# Patient Record
Sex: FEMALE | Race: WHITE | NOT HISPANIC OR LATINO | Employment: FULL TIME | URBAN - METROPOLITAN AREA
[De-identification: names, ages, dates, MRNs, and addresses within clinical notes are randomized per-mention and may not be internally consistent; named-entity substitution may affect disease eponyms.]

---

## 2018-01-25 PROBLEM — L30.9 DERMATITIS: Status: ACTIVE | Noted: 2018-01-25

## 2018-01-25 PROBLEM — I10 HYPERTENSION: Status: ACTIVE | Noted: 2018-01-25

## 2018-01-26 PROBLEM — M51.36 DEGENERATIVE DISC DISEASE, LUMBAR: Status: ACTIVE | Noted: 2018-01-26

## 2018-01-26 PROBLEM — M51.369 DEGENERATIVE DISC DISEASE, LUMBAR: Status: ACTIVE | Noted: 2018-01-26

## 2018-06-13 PROBLEM — L30.9 DERMATITIS: Status: RESOLVED | Noted: 2018-01-25 | Resolved: 2018-06-13

## 2018-09-24 ENCOUNTER — HOSPITAL ENCOUNTER (OUTPATIENT)
Dept: LAB | Facility: MEDICAL CENTER | Age: 53
End: 2018-09-24
Attending: NEUROLOGICAL SURGERY
Payer: COMMERCIAL

## 2018-09-24 ENCOUNTER — HOSPITAL ENCOUNTER (OUTPATIENT)
Dept: RADIOLOGY | Facility: MEDICAL CENTER | Age: 53
End: 2018-09-24
Attending: NEUROLOGICAL SURGERY
Payer: COMMERCIAL

## 2018-09-24 DIAGNOSIS — M48.061 SPINAL STENOSIS, LUMBAR REGION, WITHOUT NEUROGENIC CLAUDICATION: ICD-10-CM

## 2018-09-24 LAB
ANION GAP SERPL CALC-SCNC: 8 MMOL/L (ref 0–11.9)
APPEARANCE UR: ABNORMAL
APTT PPP: 27.9 SEC (ref 24.7–36)
BACTERIA #/AREA URNS HPF: NEGATIVE /HPF
BASOPHILS # BLD AUTO: 0.9 % (ref 0–1.8)
BASOPHILS # BLD: 0.04 K/UL (ref 0–0.12)
BILIRUB UR QL STRIP.AUTO: NEGATIVE
BUN SERPL-MCNC: 12 MG/DL (ref 8–22)
CALCIUM SERPL-MCNC: 9.1 MG/DL (ref 8.5–10.5)
CHLORIDE SERPL-SCNC: 108 MMOL/L (ref 96–112)
CO2 SERPL-SCNC: 25 MMOL/L (ref 20–33)
COLOR UR: YELLOW
CREAT SERPL-MCNC: 0.56 MG/DL (ref 0.5–1.4)
EOSINOPHIL # BLD AUTO: 0.18 K/UL (ref 0–0.51)
EOSINOPHIL NFR BLD: 3.9 % (ref 0–6.9)
EPI CELLS #/AREA URNS HPF: NORMAL /HPF
ERYTHROCYTE [DISTWIDTH] IN BLOOD BY AUTOMATED COUNT: 45.3 FL (ref 35.9–50)
GLUCOSE SERPL-MCNC: 113 MG/DL (ref 65–99)
GLUCOSE UR STRIP.AUTO-MCNC: NEGATIVE MG/DL
HCT VFR BLD AUTO: 41.6 % (ref 37–47)
HGB BLD-MCNC: 13.7 G/DL (ref 12–16)
HYALINE CASTS #/AREA URNS LPF: NORMAL /LPF
IMM GRANULOCYTES # BLD AUTO: 0.02 K/UL (ref 0–0.11)
IMM GRANULOCYTES NFR BLD AUTO: 0.4 % (ref 0–0.9)
INR PPP: 0.96 (ref 0.87–1.13)
KETONES UR STRIP.AUTO-MCNC: NEGATIVE MG/DL
LEUKOCYTE ESTERASE UR QL STRIP.AUTO: NEGATIVE
LYMPHOCYTES # BLD AUTO: 1.51 K/UL (ref 1–4.8)
LYMPHOCYTES NFR BLD: 32.4 % (ref 22–41)
MCH RBC QN AUTO: 30 PG (ref 27–33)
MCHC RBC AUTO-ENTMCNC: 32.9 G/DL (ref 33.6–35)
MCV RBC AUTO: 91 FL (ref 81.4–97.8)
MICRO URNS: ABNORMAL
MONOCYTES # BLD AUTO: 0.4 K/UL (ref 0–0.85)
MONOCYTES NFR BLD AUTO: 8.6 % (ref 0–13.4)
NEUTROPHILS # BLD AUTO: 2.51 K/UL (ref 2–7.15)
NEUTROPHILS NFR BLD: 53.8 % (ref 44–72)
NITRITE UR QL STRIP.AUTO: NEGATIVE
NRBC # BLD AUTO: 0 K/UL
NRBC BLD-RTO: 0 /100 WBC
PH UR STRIP.AUTO: 7 [PH]
PLATELET # BLD AUTO: 213 K/UL (ref 164–446)
PMV BLD AUTO: 10.5 FL (ref 9–12.9)
POTASSIUM SERPL-SCNC: 3.8 MMOL/L (ref 3.6–5.5)
PROT UR QL STRIP: NEGATIVE MG/DL
PROTHROMBIN TIME: 12.9 SEC (ref 12–14.6)
RBC # BLD AUTO: 4.57 M/UL (ref 4.2–5.4)
RBC # URNS HPF: NORMAL /HPF
RBC UR QL AUTO: NEGATIVE
SODIUM SERPL-SCNC: 141 MMOL/L (ref 135–145)
SP GR UR STRIP.AUTO: 1.01
UROBILINOGEN UR STRIP.AUTO-MCNC: 0.2 MG/DL
WBC # BLD AUTO: 4.7 K/UL (ref 4.8–10.8)
WBC #/AREA URNS HPF: NORMAL /HPF

## 2018-09-24 PROCEDURE — 36415 COLL VENOUS BLD VENIPUNCTURE: CPT

## 2018-09-24 PROCEDURE — 81001 URINALYSIS AUTO W/SCOPE: CPT

## 2018-09-24 PROCEDURE — 85610 PROTHROMBIN TIME: CPT

## 2018-09-24 PROCEDURE — 72110 X-RAY EXAM L-2 SPINE 4/>VWS: CPT

## 2018-09-24 PROCEDURE — 71046 X-RAY EXAM CHEST 2 VIEWS: CPT

## 2018-09-24 PROCEDURE — 85025 COMPLETE CBC W/AUTO DIFF WBC: CPT

## 2018-09-24 PROCEDURE — 85730 THROMBOPLASTIN TIME PARTIAL: CPT

## 2018-09-24 PROCEDURE — 80048 BASIC METABOLIC PNL TOTAL CA: CPT

## 2018-12-10 ENCOUNTER — HOSPITAL ENCOUNTER (EMERGENCY)
Facility: MEDICAL CENTER | Age: 53
End: 2018-12-10
Attending: EMERGENCY MEDICINE
Payer: COMMERCIAL

## 2018-12-10 VITALS
SYSTOLIC BLOOD PRESSURE: 206 MMHG | OXYGEN SATURATION: 98 % | HEART RATE: 99 BPM | HEIGHT: 65 IN | WEIGHT: 276.68 LBS | RESPIRATION RATE: 16 BRPM | BODY MASS INDEX: 46.1 KG/M2 | DIASTOLIC BLOOD PRESSURE: 113 MMHG | TEMPERATURE: 97.6 F

## 2018-12-10 DIAGNOSIS — M54.6 THORACOLUMBAR BACK PAIN: ICD-10-CM

## 2018-12-10 DIAGNOSIS — M54.50 THORACOLUMBAR BACK PAIN: ICD-10-CM

## 2018-12-10 PROCEDURE — 700111 HCHG RX REV CODE 636 W/ 250 OVERRIDE (IP): Performed by: EMERGENCY MEDICINE

## 2018-12-10 PROCEDURE — 99283 EMERGENCY DEPT VISIT LOW MDM: CPT

## 2018-12-10 PROCEDURE — 96372 THER/PROPH/DIAG INJ SC/IM: CPT

## 2018-12-10 RX ORDER — HYDROMORPHONE HYDROCHLORIDE 1 MG/ML
1 INJECTION, SOLUTION INTRAMUSCULAR; INTRAVENOUS; SUBCUTANEOUS ONCE
Status: COMPLETED | OUTPATIENT
Start: 2018-12-10 | End: 2018-12-10

## 2018-12-10 RX ADMIN — HYDROMORPHONE HYDROCHLORIDE 1 MG: 1 INJECTION, SOLUTION INTRAMUSCULAR; INTRAVENOUS; SUBCUTANEOUS at 19:13

## 2018-12-10 ASSESSMENT — PAIN SCALES - GENERAL
PAINLEVEL_OUTOF10: 9
PAINLEVEL_OUTOF10: 0

## 2018-12-11 NOTE — ED NOTES
D/c inst reviewed w/ the pt to include f/u care and return as needed.  The pt verb understanding.  asst out in a w/c.

## 2018-12-11 NOTE — ED PROVIDER NOTES
"ED Provider Note    CHIEF COMPLAINT  Chief Complaint   Patient presents with   • Low Back Pain     Int severe bck spasms since 1630 Pt S/P back surg lami/lami redo L 4,5,6  10/3  by Dr. Hawkins        [facility]  Malia Mckeon is a 53 y.o. female who presents to the ED with complaints of thoracic back pain.  The patient had a laminectomy redo on October in the lower back region.  The patient is been doing well today she went to the store that had an acute onset of pain primary in the paraspinous muscular of the thoracolumbar region.  Patient does have a prescription for pain medications and muscle relaxants but she states that those never really helped her so she never took them.  Because of the increasing pain she try to contact her spine surgeon Dr. Hawkins who is office told him to come to the emergency department for evaluation.  Upon arrival here she describes the pain as an 8/10 type pain primarily in the thoracolumbar region denies any loss of bowel or bladder function denies any fevers chills denies any other symptoms.    REVIEW OF SYSTEMS  See HPI for further details. All other systems are negative.     PAST MEDICAL HISTORY  Past Medical History:   Diagnosis Date   • ASTHMA    • Cellulitis    • DVT (deep venous thrombosis) (HCC)    • Gangrene (HCC)     \"from a DVT\"   • Hypertension    • Muscle disorder    • Spider bite        FAMILY HISTORY  Family History   Problem Relation Age of Onset   • Heart Disease Mother    • Hypertension Mother      Patient's family history has been discussed and is been found to be noncontributory to his present illness  SOCIAL HISTORY  Social History     Social History   • Marital status:      Spouse name: N/A   • Number of children: N/A   • Years of education: N/A     Social History Main Topics   • Smoking status: Never Smoker   • Smokeless tobacco: Never Used   • Alcohol use Yes      Comment: occasional   • Drug use: No   • Sexual activity: Not on file     Other Topics " "Concern   • Not on file     Social History Narrative   • No narrative on file      Noreen Colin M.D.      SURGICAL HISTORY  Past Surgical History:   Procedure Laterality Date   • CHOLECYSTECTOMY     • HYSTERECTOMY LAPAROSCOPY     • I & D PROCEDURE         CURRENT MEDICATIONS  Home Medications    **Home medications have not yet been reviewed for this encounter**         ALLERGIES  Allergies   Allergen Reactions   • Clindamycin    • Keflex    • Latex    • Meloxicam Hives   • Penicillins    • Toradol      Stroke        PHYSICAL EXAM  VITAL SIGNS: BP (!) 206/113 Comment: hx htn In pain and has not had PM doses  Pulse 99   Temp 36.4 °C (97.6 °F) (Temporal)   Resp 16   Ht 1.651 m (5' 5\")   Wt (!) 125.5 kg (276 lb 10.8 oz)   SpO2 98%   BMI 46.04 kg/m²    Pulse Ox interpretation nonhypoxic    Constitutional: Well developed, Well nourished, No acute distress, Non-toxic appearance.   Neck: Supple, no cervical lymphadenopathy, no meningeal signs.  Nontender midline  Lymphatic: No lymphadenopathy noted.     Abdomen: Soft, nontender nondistended. Bowel sounds are present.   Skin: Warm, Dry, No erythema,   Back: Patient is tender about the paraspinous muscular of the thoracic spine.  Midline she is otherwise nontender.  Musculoskeletal: Good range of motion in all major joints. No tenderness to palpation or major deformities noted. Intact distal pulses, no clubbing, no cyanosis, no edema,  Neurologic: Alert & oriented x 3, Normal motor function, Normal sensory function, No focal deficits noted.   Psychiatric: Affect normal, Judgment normal, Mood normal.       RADIOLOGY/PROCEDURES  No orders to display     None    COURSE & MEDICAL DECISION MAKING  Pertinent Labs & Imaging studies reviewed. (See chart for details)  Patient presents for evaluation.  Clinically the patient is tender about the paraspinous muscular of her upper mid back.  She has no midline tent tenderness.  Patient is moving all 4 extremities and has no signs " of nerve impingement.  At this point I do feel is muscular strain.  I will give her a dose of Dilaudid here she has an appointment with her neurosurgeon tomorrow.  The patient will follow up tomorrow for further outpatient treatment and care.  Currently I do not feel the patient is cauda equina syndrome nor compressed cord.  I do for the patient has a muscular strain and recommend a follow-up as above.    FINAL IMPRESSION  1. Thoracolumbar back pain       The patient will return for new or worsening symptoms and is stable at the time of discharge.    The patient is referred to a primary physician for blood pressure management, diabetic screening, and for all other preventative health concerns.        DISPOSITION:  Patient will be discharged home in stable condition.    FOLLOW UP:  Bruce Hawkins M.D.  [id number] [facility] [id number]  [facility] [id number]  [phone]      tomorrow for further care      OUTPATIENT MEDICATIONS:  New Prescriptions    No medications on file         Electronically signed by: Rito Del Toro, 12/10/2018 7:07 PM

## 2020-04-24 PROBLEM — R73.03 PREDIABETES: Status: ACTIVE | Noted: 2020-04-24

## 2020-04-24 PROBLEM — R74.8 ELEVATED LIVER ENZYMES: Status: ACTIVE | Noted: 2020-04-24

## 2020-05-28 PROBLEM — Z86.19 HISTORY OF POSITIVE HEPATITIS C: Status: ACTIVE | Noted: 2020-05-28

## 2020-05-28 PROBLEM — Z98.890 HISTORY OF LUMBAR SURGERY: Status: ACTIVE | Noted: 2020-05-28

## 2020-11-01 PROBLEM — U07.1 COVID-19: Status: ACTIVE | Noted: 2020-11-01

## 2021-03-15 ENCOUNTER — APPOINTMENT (OUTPATIENT)
Dept: RADIOLOGY | Facility: MEDICAL CENTER | Age: 56
End: 2021-03-15
Attending: EMERGENCY MEDICINE
Payer: COMMERCIAL

## 2021-03-15 ENCOUNTER — HOSPITAL ENCOUNTER (EMERGENCY)
Facility: MEDICAL CENTER | Age: 56
End: 2021-03-15
Attending: EMERGENCY MEDICINE
Payer: COMMERCIAL

## 2021-03-15 VITALS
DIASTOLIC BLOOD PRESSURE: 95 MMHG | BODY MASS INDEX: 48.82 KG/M2 | WEIGHT: 293 LBS | SYSTOLIC BLOOD PRESSURE: 187 MMHG | TEMPERATURE: 97.8 F | HEART RATE: 82 BPM | OXYGEN SATURATION: 94 % | HEIGHT: 65 IN | RESPIRATION RATE: 18 BRPM

## 2021-03-15 DIAGNOSIS — T50.Z95A ADVERSE EFFECT OF VACCINE, INITIAL ENCOUNTER: ICD-10-CM

## 2021-03-15 DIAGNOSIS — I10 ESSENTIAL HYPERTENSION: ICD-10-CM

## 2021-03-15 LAB
ALBUMIN SERPL BCP-MCNC: 4.1 G/DL (ref 3.2–4.9)
ALBUMIN/GLOB SERPL: 1.1 G/DL
ALP SERPL-CCNC: 166 U/L (ref 30–99)
ALT SERPL-CCNC: 140 U/L (ref 2–50)
ANION GAP SERPL CALC-SCNC: 10 MMOL/L (ref 7–16)
AST SERPL-CCNC: 87 U/L (ref 12–45)
BASOPHILS # BLD AUTO: 0.9 % (ref 0–1.8)
BASOPHILS # BLD: 0.05 K/UL (ref 0–0.12)
BILIRUB SERPL-MCNC: 0.4 MG/DL (ref 0.1–1.5)
BUN SERPL-MCNC: 18 MG/DL (ref 8–22)
CALCIUM SERPL-MCNC: 9.2 MG/DL (ref 8.4–10.2)
CHLORIDE SERPL-SCNC: 102 MMOL/L (ref 96–112)
CO2 SERPL-SCNC: 27 MMOL/L (ref 20–33)
CREAT SERPL-MCNC: 0.61 MG/DL (ref 0.5–1.4)
EKG IMPRESSION: NORMAL
EOSINOPHIL # BLD AUTO: 0.25 K/UL (ref 0–0.51)
EOSINOPHIL NFR BLD: 4.7 % (ref 0–6.9)
ERYTHROCYTE [DISTWIDTH] IN BLOOD BY AUTOMATED COUNT: 43.1 FL (ref 35.9–50)
GLOBULIN SER CALC-MCNC: 3.8 G/DL (ref 1.9–3.5)
GLUCOSE SERPL-MCNC: 96 MG/DL (ref 65–99)
HCT VFR BLD AUTO: 45.6 % (ref 37–47)
HGB BLD-MCNC: 14.9 G/DL (ref 12–16)
IMM GRANULOCYTES # BLD AUTO: 0.01 K/UL (ref 0–0.11)
IMM GRANULOCYTES NFR BLD AUTO: 0.2 % (ref 0–0.9)
LYMPHOCYTES # BLD AUTO: 1.48 K/UL (ref 1–4.8)
LYMPHOCYTES NFR BLD: 27.9 % (ref 22–41)
MCH RBC QN AUTO: 28.9 PG (ref 27–33)
MCHC RBC AUTO-ENTMCNC: 32.7 G/DL (ref 33.6–35)
MCV RBC AUTO: 88.4 FL (ref 81.4–97.8)
MONOCYTES # BLD AUTO: 0.45 K/UL (ref 0–0.85)
MONOCYTES NFR BLD AUTO: 8.5 % (ref 0–13.4)
NEUTROPHILS # BLD AUTO: 3.06 K/UL (ref 2–7.15)
NEUTROPHILS NFR BLD: 57.8 % (ref 44–72)
NRBC # BLD AUTO: 0 K/UL
NRBC BLD-RTO: 0 /100 WBC
PLATELET # BLD AUTO: 209 K/UL (ref 164–446)
PMV BLD AUTO: 9.9 FL (ref 9–12.9)
POTASSIUM SERPL-SCNC: 4.3 MMOL/L (ref 3.6–5.5)
PROT SERPL-MCNC: 7.9 G/DL (ref 6–8.2)
RBC # BLD AUTO: 5.16 M/UL (ref 4.2–5.4)
SODIUM SERPL-SCNC: 139 MMOL/L (ref 135–145)
TROPONIN T SERPL-MCNC: 8 NG/L (ref 6–19)
WBC # BLD AUTO: 5.3 K/UL (ref 4.8–10.8)

## 2021-03-15 PROCEDURE — 99284 EMERGENCY DEPT VISIT MOD MDM: CPT

## 2021-03-15 PROCEDURE — 700101 HCHG RX REV CODE 250: Performed by: EMERGENCY MEDICINE

## 2021-03-15 PROCEDURE — 80053 COMPREHEN METABOLIC PANEL: CPT

## 2021-03-15 PROCEDURE — 96376 TX/PRO/DX INJ SAME DRUG ADON: CPT

## 2021-03-15 PROCEDURE — 700111 HCHG RX REV CODE 636 W/ 250 OVERRIDE (IP): Performed by: EMERGENCY MEDICINE

## 2021-03-15 PROCEDURE — 85025 COMPLETE CBC W/AUTO DIFF WBC: CPT

## 2021-03-15 PROCEDURE — 84484 ASSAY OF TROPONIN QUANT: CPT

## 2021-03-15 PROCEDURE — 93005 ELECTROCARDIOGRAM TRACING: CPT

## 2021-03-15 PROCEDURE — 71045 X-RAY EXAM CHEST 1 VIEW: CPT

## 2021-03-15 PROCEDURE — 96374 THER/PROPH/DIAG INJ IV PUSH: CPT

## 2021-03-15 PROCEDURE — 96375 TX/PRO/DX INJ NEW DRUG ADDON: CPT

## 2021-03-15 PROCEDURE — 93005 ELECTROCARDIOGRAM TRACING: CPT | Performed by: EMERGENCY MEDICINE

## 2021-03-15 RX ORDER — ATENOLOL 25 MG/1
25 TABLET ORAL 2 TIMES DAILY
Qty: 60 TABLET | Refills: 1 | Status: SHIPPED | OUTPATIENT
Start: 2021-03-15 | End: 2021-12-06

## 2021-03-15 RX ORDER — LABETALOL HYDROCHLORIDE 5 MG/ML
20 INJECTION, SOLUTION INTRAVENOUS ONCE
Status: COMPLETED | OUTPATIENT
Start: 2021-03-15 | End: 2021-03-15

## 2021-03-15 RX ORDER — MORPHINE SULFATE 4 MG/ML
4 INJECTION, SOLUTION INTRAMUSCULAR; INTRAVENOUS ONCE
Status: COMPLETED | OUTPATIENT
Start: 2021-03-15 | End: 2021-03-15

## 2021-03-15 RX ORDER — ONDANSETRON 2 MG/ML
4 INJECTION INTRAMUSCULAR; INTRAVENOUS ONCE
Status: COMPLETED | OUTPATIENT
Start: 2021-03-15 | End: 2021-03-15

## 2021-03-15 RX ORDER — METHYLPREDNISOLONE SODIUM SUCCINATE 125 MG/2ML
125 INJECTION, POWDER, LYOPHILIZED, FOR SOLUTION INTRAMUSCULAR; INTRAVENOUS ONCE
Status: COMPLETED | OUTPATIENT
Start: 2021-03-15 | End: 2021-03-15

## 2021-03-15 RX ORDER — DIPHENHYDRAMINE HYDROCHLORIDE 50 MG/ML
25 INJECTION INTRAMUSCULAR; INTRAVENOUS ONCE
Status: COMPLETED | OUTPATIENT
Start: 2021-03-15 | End: 2021-03-15

## 2021-03-15 RX ADMIN — MORPHINE SULFATE 4 MG: 4 INJECTION INTRAVENOUS at 14:59

## 2021-03-15 RX ADMIN — MORPHINE SULFATE 4 MG: 4 INJECTION INTRAVENOUS at 16:12

## 2021-03-15 RX ADMIN — METHYLPREDNISOLONE SODIUM SUCCINATE 125 MG: 125 INJECTION, POWDER, FOR SOLUTION INTRAMUSCULAR; INTRAVENOUS at 14:58

## 2021-03-15 RX ADMIN — FAMOTIDINE 20 MG: 10 INJECTION INTRAVENOUS at 14:59

## 2021-03-15 RX ADMIN — ONDANSETRON 4 MG: 2 INJECTION INTRAMUSCULAR; INTRAVENOUS at 14:59

## 2021-03-15 RX ADMIN — LABETALOL HYDROCHLORIDE 20 MG: 5 INJECTION, SOLUTION INTRAVENOUS at 16:12

## 2021-03-15 RX ADMIN — DIPHENHYDRAMINE HYDROCHLORIDE 25 MG: 50 INJECTION, SOLUTION INTRAMUSCULAR; INTRAVENOUS at 15:11

## 2021-03-15 ASSESSMENT — FIBROSIS 4 INDEX: FIB4 SCORE: 1.66

## 2021-03-15 NOTE — ED NOTES
Pt states that she just feels hot, flushed and dizzy post Covid Vaccine. Pts airway at this time intact, pt declines any shortness of breath.

## 2021-03-15 NOTE — ED TRIAGE NOTES
"Chief Complaint   Patient presents with   • Allergic Reaction     Pt received first Moderna vaccine at 1300   • Dizziness   • Shortness of Breath     BP (!) 260/143   Pulse 100   Temp 36.6 °C (97.9 °F) (Temporal)   Resp 20   Ht 1.651 m (5' 5\")   Wt (!) 133 kg (293 lb 14 oz)   SpO2 95%   BMI 48.90 kg/m²       Pt received the first Covid Vaccine via [employer] today around 1300.  Reports left to return to work, felt sudden onset dizziness, shortness of breath and generalized itching.      "

## 2021-03-16 NOTE — ED PROVIDER NOTES
"ED Provider Note    CHIEF COMPLAINT  Chief Complaint   Patient presents with   • Allergic Reaction     Pt received first Moderna vaccine at 1300   • Dizziness   • Shortness of Breath       HPI  Malia Mckeon is a 55 y.o. female who presents to the emergency room today with complaints of shortness of breath, dizziness, rash after receiving Covid vaccine at 1:00 PM.  Patient developed rash to her face arms chest with itching to the area she had elevated heart rate and blood pressure of 230/130 and dizziness with this.  No fever no chills no changes to bowel or bladder.  She does have a history of hypertension but never this high.  This is her first dose of maternal vaccine which she received at 1 PM today.    REVIEW OF SYSTEMS  See HPI for further details. All other systems are negative.     PAST MEDICAL HISTORY  Past Medical History:   Diagnosis Date   • ASTHMA    • Cellulitis    • COVID-19    • DVT (deep venous thrombosis) (HCC)    • Gangrene (HCC)     \"from a DVT\"   • Hypertension    • Muscle disorder    • Spider bite        FAMILY HISTORY  [unfilled]    SOCIAL HISTORY  Social History     Socioeconomic History   • Marital status:      Spouse name: Not on file   • Number of children: Not on file   • Years of education: Not on file   • Highest education level: Not on file   Occupational History   • Not on file   Tobacco Use   • Smoking status: Never Smoker   • Smokeless tobacco: Never Used   Substance and Sexual Activity   • Alcohol use: Yes   • Drug use: No   • Sexual activity: Not on file   Other Topics Concern   • Not on file   Social History Narrative   • Not on file     Social Determinants of Health     Financial Resource Strain:    • Difficulty of Paying Living Expenses:    Food Insecurity:    • Worried About Running Out of Food in the Last Year:    • Ran Out of Food in the Last Year:    Transportation Needs:    • Lack of Transportation (Medical):    • Lack of Transportation (Non-Medical):  " "  Physical Activity:    • Days of Exercise per Week:    • Minutes of Exercise per Session:    Stress:    • Feeling of Stress :    Social Connections:    • Frequency of Communication with Friends and Family:    • Frequency of Social Gatherings with Friends and Family:    • Attends [employer] Services:    • Active Member of Clubs or Organizations:    • Attends Club or Organization Meetings:    • Marital Status:    Intimate Partner Violence:    • Fear of Current or Ex-Partner:    • Emotionally Abused:    • Physically Abused:    • Sexually Abused:        SURGICAL HISTORY  Past Surgical History:   Procedure Laterality Date   • CHOLECYSTECTOMY     • HYSTERECTOMY LAPAROSCOPY     • I & D PROCEDURE         CURRENT MEDICATIONS  Home Medications     Reviewed by Divya Li (Pharmacy Tech) on 03/15/21 at 1650  Med List Status: Complete   Medication Last Dose Status   acyclovir (ZOVIRAX) 400 MG tablet NOT TAKING Active   asa/apap/caffeine (EXCEDRIN) 250-250-65 MG Tab 3/15/2021 Active   lisinopril (PRINIVIL) 40 MG tablet NOT TAKING Active   pantoprazole (PROTONIX) 20 MG tablet 3/15/2021 Active   tizanidine (ZANAFLEX) 4 MG Tab NOT TAKING Active   triamcinolone acetonide (KENALOG) 0.5 % Cream NOT TAKING Active   verapamil ER (CALAN-SR) 180 MG Tab CR 3/15/2021 Active                ALLERGIES  Allergies   Allergen Reactions   • Cephalexin Rash, Itching and Nausea     Pt reported   • Clindamycin    • Iodine Rash     needs benadryl with CT scan   • Keflex    • Latex    • Meloxicam Hives   • Penicillins    • Toradol      Stroke        PHYSICAL EXAM  VITAL SIGNS: BP (!) 187/95   Pulse 82   Temp 36.6 °C (97.8 °F) (Temporal)   Resp 18   Ht 1.651 m (5' 5\")   Wt (!) 133 kg (293 lb 14 oz)   SpO2 94%   BMI 48.90 kg/m²       Constitutional: Well developed, Well nourished,  acute distress, Non-toxic appearance.   HENT: Normocephalic, Atraumatic, Bilateral external ears normal, Oropharynx moist, No oral exudates, Nose normal.   Eyes: " PERRLA, EOMI, Conjunctiva normal, No discharge.   Neck: Normal range of motion, No tenderness, Supple, No stridor.   Lymphatic: No lymphadenopathy noted.   Cardiovascular: Normal heart rate, Normal rhythm, No murmurs, No rubs, No gallops.   Thorax & Lungs: Normal breath sounds, No respiratory distress, No wheezing, No chest tenderness.   Abdomen: Bowel sounds normal, Soft, No tenderness, No masses, No pulsatile masses.   Skin: Warm, Dry, No erythema, urticaric rash noted to the neck and chest wall extremities distant with allergic reaction.   Back: No tenderness, No CVA tenderness.     Extremities: Intact distal pulses, No edema, No tenderness, No cyanosis, No clubbing.   Musculoskeletal: Good range of motion in all major joints. No tenderness to palpation or major deformities noted.   Neurologic: Alert & oriented x 3, Normal motor function, Normal sensory function, No focal deficits noted.   Psychiatric: Affect normal, Judgment normal, Mood normal.     EKG  Normal sinus rhythm 80 bpm, no ST elevation or depression, no widening of the QRS complex, good R wave progression, OR intervals are normal, no diagnostic Q waves.  The twelve-lead EKG there is no previous EKG available to staff for comparison.    RADIOLOGY/PROCEDURES  DX-CHEST-PORTABLE (1 VIEW)   Final Result         1. No acute cardiopulmonary abnormalities are identified.            COURSE & MEDICAL DECISION MAKING  Pertinent Labs & Imaging studies reviewed. (See chart for details)  Patient given Pepcid, Benadryl and Solu-Medrol her urticaria has resolved.  She was given Normadine for her blood pressure which has come down to 187/95.  I will add atenolol 25 mg twice a day to her blood pressure regiment and continue on her regular medications.  She did develop some headaches for high blood pressure was given morphine for this and resolved on reexamination she is asymptomatic and feeling much improved.  Reexamination prior to discharge she was discharged stable  and improved condition as above to home.  Recommendation at this time is not to receive further vaccine it is noted that she had previous Covid infection and should be well protected but she will review this with her primary care physician for further instructions.    FINAL IMPRESSION  1.  Acute vaccine reaction  2.  Hypertension  3.  Allergic reaction         Electronically signed by: Giovanni Gonzalez D.O., 3/15/2021 5:46 PM

## 2021-03-16 NOTE — ED NOTES
Pt provided with discharge paper work and prescription education. Pt aware of follow up instructions. No questions at this time. Pt to ambulate out of ER.

## 2021-07-14 ENCOUNTER — HOSPITAL ENCOUNTER (OUTPATIENT)
Dept: LAB | Facility: MEDICAL CENTER | Age: 56
End: 2021-07-14
Attending: PHYSICIAN ASSISTANT
Payer: COMMERCIAL

## 2021-07-14 LAB
ALBUMIN SERPL BCP-MCNC: 3.7 G/DL (ref 3.2–4.9)
ALP SERPL-CCNC: 133 U/L (ref 30–99)
ALT SERPL-CCNC: 120 U/L (ref 2–50)
AST SERPL-CCNC: 83 U/L (ref 12–45)
BILIRUB CONJ SERPL-MCNC: <0.2 MG/DL (ref 0.1–0.5)
BILIRUB INDIRECT SERPL-MCNC: ABNORMAL MG/DL (ref 0–1)
BILIRUB SERPL-MCNC: 0.5 MG/DL (ref 0.1–1.5)
HBV CORE AB SERPL QL IA: REACTIVE
HBV CORE IGM SER QL: NORMAL
HBV SURFACE AB SERPL IA-ACNC: 414 MIU/ML (ref 0–10)
HBV SURFACE AG SER QL: NORMAL
INR PPP: 1 (ref 0.87–1.13)
PLATELET # BLD AUTO: 216 K/UL (ref 164–446)
PROT SERPL-MCNC: 7.3 G/DL (ref 6–8.2)
PROTHROMBIN TIME: 12.9 SEC (ref 12–14.6)

## 2021-07-14 PROCEDURE — 83883 ASSAY NEPHELOMETRY NOT SPEC: CPT

## 2021-07-14 PROCEDURE — 84520 ASSAY OF UREA NITROGEN: CPT

## 2021-07-14 PROCEDURE — 84460 ALANINE AMINO (ALT) (SGPT): CPT

## 2021-07-14 PROCEDURE — 87522 HEPATITIS C REVRS TRNSCRPJ: CPT

## 2021-07-14 PROCEDURE — 84450 TRANSFERASE (AST) (SGOT): CPT

## 2021-07-14 PROCEDURE — 82105 ALPHA-FETOPROTEIN SERUM: CPT

## 2021-07-14 PROCEDURE — 86707 HEPATITIS BE ANTIBODY: CPT

## 2021-07-14 PROCEDURE — 82977 ASSAY OF GGT: CPT

## 2021-07-14 PROCEDURE — 87340 HEPATITIS B SURFACE AG IA: CPT

## 2021-07-14 PROCEDURE — 86708 HEPATITIS A ANTIBODY: CPT

## 2021-07-14 PROCEDURE — 87350 HEPATITIS BE AG IA: CPT

## 2021-07-14 PROCEDURE — 86704 HEP B CORE ANTIBODY TOTAL: CPT

## 2021-07-14 PROCEDURE — 36415 COLL VENOUS BLD VENIPUNCTURE: CPT

## 2021-07-14 PROCEDURE — 85610 PROTHROMBIN TIME: CPT

## 2021-07-14 PROCEDURE — 86706 HEP B SURFACE ANTIBODY: CPT

## 2021-07-14 PROCEDURE — 86705 HEP B CORE ANTIBODY IGM: CPT

## 2021-07-14 PROCEDURE — 80076 HEPATIC FUNCTION PANEL: CPT

## 2021-07-14 PROCEDURE — 85049 AUTOMATED PLATELET COUNT: CPT

## 2021-07-16 LAB
AFP-TM SERPL-MCNC: 7 NG/ML (ref 0–9)
HAV AB SER QL IA: NEGATIVE
HBV E AB SERPL QL IA: POSITIVE
HBV E AG SERPL QL IA: NEGATIVE
HCV RNA SERPL NAA+PROBE-ACNC: ABNORMAL K[IU]/ML
HCV RNA SERPL NAA+PROBE-LOG IU: 4.82 LOG IU/ML
HCV RNA SERPL QL NAA+PROBE: DETECTED

## 2021-07-17 LAB
A2 MACROGLOB SERPL-MCNC: 367 MG/DL (ref 131–293)
ALT SERPL-CCNC: 155 U/L (ref 5–40)
ANNOTATION COMMENT IMP: ABNORMAL
AST SERPL-CCNC: 95 U/L (ref 9–40)
BUN SERPL-SCNC: 14 MG/DL (ref 7–20)
CIRRHOMETER PT SCORE Q4850: 0.04
FIBROSIS STAGE SERPL QL: ABNORMAL
GGT SERPL-CCNC: 148 U/L (ref 7–33)
INFLAMETER META CLASS Q4853: ABNORMAL
INFLAMETER PT SCORE Q4852: 0.77
LIVER FIBR SCORE SERPL CALC.FIBROMETER: 0.79
PATHOLOGY STUDY: ABNORMAL
PROTHROM ACT/NOR PPP: 99 % (ref 90–120)

## 2021-08-02 ENCOUNTER — OFFICE VISIT (OUTPATIENT)
Dept: URGENT CARE | Facility: CLINIC | Age: 56
End: 2021-08-02
Payer: COMMERCIAL

## 2021-08-02 ENCOUNTER — APPOINTMENT (OUTPATIENT)
Dept: RADIOLOGY | Facility: IMAGING CENTER | Age: 56
End: 2021-08-02
Attending: PHYSICIAN ASSISTANT
Payer: COMMERCIAL

## 2021-08-02 VITALS
TEMPERATURE: 97.9 F | OXYGEN SATURATION: 94 % | HEIGHT: 67 IN | DIASTOLIC BLOOD PRESSURE: 98 MMHG | WEIGHT: 273.9 LBS | SYSTOLIC BLOOD PRESSURE: 150 MMHG | BODY MASS INDEX: 42.99 KG/M2 | RESPIRATION RATE: 18 BRPM | HEART RATE: 72 BPM

## 2021-08-02 DIAGNOSIS — T14.8XXA WOUND INFECTION: ICD-10-CM

## 2021-08-02 DIAGNOSIS — S91.341A PUNCTURE WOUND OF RIGHT FOOT WITH FOREIGN BODY, INITIAL ENCOUNTER: ICD-10-CM

## 2021-08-02 DIAGNOSIS — L03.115 CELLULITIS OF RIGHT LOWER EXTREMITY: ICD-10-CM

## 2021-08-02 DIAGNOSIS — R11.0 NAUSEA: ICD-10-CM

## 2021-08-02 DIAGNOSIS — S91.342A PUNCTURE WOUND OF LEFT FOOT WITH FOREIGN BODY, INITIAL ENCOUNTER: ICD-10-CM

## 2021-08-02 DIAGNOSIS — L08.9 WOUND INFECTION: ICD-10-CM

## 2021-08-02 PROCEDURE — 99214 OFFICE O/P EST MOD 30 MIN: CPT | Performed by: PHYSICIAN ASSISTANT

## 2021-08-02 PROCEDURE — 73620 X-RAY EXAM OF FOOT: CPT | Mod: TC,LT | Performed by: PHYSICIAN ASSISTANT

## 2021-08-02 RX ORDER — ONDANSETRON HYDROCHLORIDE 8 MG/1
8 TABLET, FILM COATED ORAL EVERY 8 HOURS PRN
Qty: 15 TABLET | Refills: 0 | Status: SHIPPED | OUTPATIENT
Start: 2021-08-02 | End: 2021-10-14 | Stop reason: SDUPTHER

## 2021-08-02 RX ORDER — TRAMADOL HYDROCHLORIDE 50 MG/1
50 TABLET ORAL
Qty: 7 TABLET | Refills: 0 | Status: SHIPPED | OUTPATIENT
Start: 2021-08-02 | End: 2021-08-09

## 2021-08-02 RX ORDER — PANTOPRAZOLE SODIUM 40 MG/1
40 TABLET, DELAYED RELEASE ORAL DAILY
COMMUNITY
End: 2021-12-06

## 2021-08-02 RX ORDER — DOXYCYCLINE HYCLATE 100 MG
100 TABLET ORAL 2 TIMES DAILY
Qty: 14 TABLET | Refills: 0 | Status: SHIPPED | OUTPATIENT
Start: 2021-08-02 | End: 2021-08-09

## 2021-08-02 ASSESSMENT — FIBROSIS 4 INDEX: FIB4 SCORE: 2

## 2021-08-02 NOTE — LETTER
August 2, 2021         Patient: Malia Mckeon   YOB: 1965   Date of Visit: 8/2/2021           To Whom it May Concern:    Malia Mckeon was seen in my clinic on 8/2/2021. She may return to work on Wednesday   (8/4/21).     If you have any questions or concerns, please don't hesitate to call.        Sincerely,           Travis Smith P.A.-C.  Electronically Signed

## 2021-08-04 ASSESSMENT — ENCOUNTER SYMPTOMS
SENSORY CHANGE: 0
FALLS: 0
TINGLING: 0
NAUSEA: 1

## 2021-08-04 NOTE — PROGRESS NOTES
"Subjective:      Shakira Mckeon is a 55 y.o. female who presents with Foot Pain (foreign body- (R) heel steel fragments x 3 weeks; (L) sole of foot glass x last night; )            Patient is a 55-year-old female who presents to urgent care concern regarding possible steel fragment to the right heel for the last 3 weeks.  She then reports that last night she was walking barefoot and believes she stepped on a piece of glass as her  accidentally broke a glass.  She reports pain to the sole of the left foot and pain to the heel of the right foot.  She notes that today the pain has significantly worsened prompting evaluation today.  She does report digging out a piece of fragment from the right heel however feels that she may continue to have something remaining.  She does report pain in particular to this region with surrounding redness noted scab.  She denies any fevers or chills.  She does report associated nausea however is uncertain if this is related.  She reports taking NSAIDs along with Tylenol with minimal improvement of discomfort.  Patient does report that her tetanus is up-to-date at this time.    Foot Problem  This is a new problem. Episode onset: 3 weeks ago, and then last night. The problem occurs constantly. The problem has been gradually worsening. Associated symptoms include nausea. Exacerbated by: Pressure on these regions. Treatments tried: As above.       Review of Systems   Constitutional: Positive for malaise/fatigue.   Gastrointestinal: Positive for nausea.   Musculoskeletal: Positive for joint pain. Negative for falls.   Neurological: Negative for tingling and sensory change.   All other systems reviewed and are negative.         Objective:     /98 (BP Location: Left arm, Patient Position: Sitting)   Pulse 72   Temp 36.6 °C (97.9 °F) (Temporal)   Resp 18   Ht 1.702 m (5' 7\")   Wt 124 kg (273 lb 14.4 oz)   SpO2 94%   BMI 42.90 kg/m²    PMH:  has a past medical history " of ASTHMA, Cellulitis, COVID-19, DVT (deep venous thrombosis) ([facility]), Gangrene ([facility]), Hypertension, Muscle disorder, and Spider bite.  MEDS: Reviewed .   ALLERGIES:   Allergies   Allergen Reactions   • Cephalexin Rash, Itching and Nausea     Pt reported   • Clindamycin    • Covid-19 Mrna Vacc (Moderna) [ Covid-19 Mrna Vacc (Moderna)]      Face swelling,    • Iodine Rash     needs benadryl with CT scan   • Keflex    • Ketorolac Tromethamine      Other reaction(s): Other (See Comments)  Stroke    • Latex    • Meloxicam Hives   • Penicillins    • Toradol      Stroke      SURGHX:   Past Surgical History:   Procedure Laterality Date   • CHOLECYSTECTOMY     • HYSTERECTOMY LAPAROSCOPY     • I & D PROCEDURE       SOCHX:  reports that she has never smoked. She has never used smokeless tobacco. She reports current alcohol use. She reports that she does not use drugs.  FH: Family history was reviewed, no pertinent findings to report    Physical Exam  Vitals reviewed.   Constitutional:       General: She is not in acute distress.     Appearance: She is well-developed.   HENT:      Head: Normocephalic and atraumatic.      Right Ear: External ear normal.      Left Ear: External ear normal.      Mouth/Throat:      Pharynx: No oropharyngeal exudate.   Eyes:      Conjunctiva/sclera: Conjunctivae normal.      Pupils: Pupils are equal, round, and reactive to light.   Neck:      Trachea: No tracheal deviation.   Cardiovascular:      Rate and Rhythm: Normal rate and regular rhythm.      Heart sounds: No murmur heard.     Pulmonary:      Effort: Pulmonary effort is normal. No respiratory distress.      Breath sounds: Normal breath sounds.   Musculoskeletal:         General: Normal range of motion.      Cervical back: Normal range of motion and neck supple.      Right lower leg: Edema present.      Left lower leg: Edema present.      Comments: Right foot: Heel small less than 1 cm superficial wound with scab with surrounding redness scant  amount of clear yellowish drainage.  Surrounding erythema extending to 4 cm.  Diffusely tender.  No palpable foreign body noted.  Without streaking or calf tenderness.  Left foot plantar aspect-tenderness along the fourth and fifth metatarsal midfoot region without discrete palpable foreign body or injury paola.  Without surrounding erythema.   Skin:     General: Skin is warm.      Findings: No rash.   Neurological:      Mental Status: She is alert and oriented to person, place, and time.      Coordination: Coordination normal.   Psychiatric:         Behavior: Behavior normal.         Thought Content: Thought content normal.         Judgment: Judgment normal.                        Assessment/Plan:        1. Cellulitis of right lower extremity     2. Wound infection  doxycycline (VIBRAMYCIN) 100 MG Tab    traMADol (ULTRAM) 50 MG Tab   3. Nausea  ondansetron (ZOFRAN) 8 MG Tab         RADIOLOGY RESULTS   DX-FOOT-2- LEFT    Result Date: 8/2/2021 8/2/2021 3:41 PM HISTORY/REASON FOR EXAM:  ? glass piece to plantar aspect of midfoot. TECHNIQUE/EXAM DESCRIPTION AND NUMBER OF VIEWS: 2 views of the left foot COMPARISON: None. FINDINGS: There is normal bony mineralization.  There is no evidence of fracture, dislocation, or osseous lesion.  There is no evidence of soft tissue injury.     1.  No radiographic evidence of acute injury. 2. No evidence of radiopaque foreign body.    DX-FOOT-2- RIGHT    Result Date: 8/2/2021 8/2/2021 3:41 PM HISTORY/REASON FOR EXAM:  Suspected heel FB. TECHNIQUE/EXAM DESCRIPTION AND NUMBER OF VIEWS: 2 views of the right foot COMPARISON: None. FINDINGS: There is normal bony mineralization.  There is no evidence of fracture, dislocation, or osseous lesion.  There is no evidence of soft tissue injury. There is evidence of osseous stress injury changes of the midfoot.     1.  No radiographic evidence of acute injury. 2. No evidence of metallic foreign body in the right foot. 3. Osteoarthritic changes  of the midfoot.       Discussed and reviewed x-ray findings with the patient today of which palpable foreign body was not noted nor noted on x-ray.  It does appear that healing is with secondary infection at this time we will start the patient on doxycycline.  Limited options secondary to allergies.  Patient is requesting something for pain at this time is pain is significantly worsened over the last few days.  NARXCHECK was reviewed by myself-  Document does not reveal any concerning patterns. Pt. was advised to avoid the operation of heavy machine along with driving while on such medications. Finally pt. was advised to use medication only as prescribed.     Work note provided  Appropriate PPE worn at all times by provider.   Pt. Had face mask on throughout entirety of the visit other than oropharyngeal examination today.     Side effects of OTC or prescribed medications discussed.     DDX, Supportive care, and indications for immediate follow-up discussed with patient.    Instructed to return to clinic or nearest emergency department if we are not available for any change in condition, further concerns, or worsening of symptoms.    The patient and/or guardian demonstrated a good understanding and agreed with the treatment plan.  Of note greater than 30 minutes was utilized discussing the above, reviewing x-rays, investigating region for foreign body.  Please note that this dictation was created using voice recognition software. I have made every reasonable attempt to correct obvious errors, but I expect that there are errors of grammar and possibly content that I did not discover before finalizing the note.

## 2021-08-10 ENCOUNTER — OFFICE VISIT (OUTPATIENT)
Dept: URGENT CARE | Facility: CLINIC | Age: 56
End: 2021-08-10
Payer: COMMERCIAL

## 2021-08-10 VITALS
TEMPERATURE: 96.8 F | HEIGHT: 65 IN | OXYGEN SATURATION: 91 % | BODY MASS INDEX: 46.98 KG/M2 | HEART RATE: 88 BPM | WEIGHT: 282 LBS | SYSTOLIC BLOOD PRESSURE: 160 MMHG | RESPIRATION RATE: 12 BRPM | DIASTOLIC BLOOD PRESSURE: 110 MMHG

## 2021-08-10 DIAGNOSIS — Z51.89 VISIT FOR WOUND CHECK: ICD-10-CM

## 2021-08-10 DIAGNOSIS — M79.671 PAIN OF RIGHT HEEL: ICD-10-CM

## 2021-08-10 PROCEDURE — 99214 OFFICE O/P EST MOD 30 MIN: CPT | Performed by: PHYSICIAN ASSISTANT

## 2021-08-10 RX ORDER — SULFAMETHOXAZOLE AND TRIMETHOPRIM 800; 160 MG/1; MG/1
1 TABLET ORAL 2 TIMES DAILY
Qty: 10 TABLET | Refills: 0 | Status: SHIPPED | OUTPATIENT
Start: 2021-08-10 | End: 2021-08-15

## 2021-08-10 ASSESSMENT — FIBROSIS 4 INDEX: FIB4 SCORE: 2

## 2021-08-10 NOTE — PROGRESS NOTES
Subjective:   Malia Mckeon is a 55 y.o. female who presents for Foot Pain (R foot infection, cut on metal. Antibiotics are not helping was here 8 days ago)      HPI  Patient presents the clinic for wound check to her right heel. She states she stepped on a piece of steel fragment that cut her right heel about 4 weeks ago. She was seen here in the urgent care 8 days ago and had her right heel evaluated and placed on doxycycline. She also reports she had stepped on glass to her left foot about a week ago and she was evaluated for this as well 8 days ago in the urgent care. She is only concerned about her right heel from the wound. She continues to have pain on the sides of her right heel and back of her right heel. The wound had mild drainage last night. She took her full course of doxycycline. Redness has improved. Ibuprofen and tramadol with minimal relief. No fevers or chills. Reports a history of recent liver issues that is causing swelling of her feet she states.      Medications:    • asa/apap/caffeine Tabs  • atenolol Tabs  • ondansetron Tabs  • pantoprazole Tbec  • tizanidine Tabs    Allergies: Cephalexin, Clindamycin, Covid-19 mrna vacc (moderna) [ covid-19 mrna vacc (moderna)], Iodine, Keflex, Ketorolac tromethamine, Latex, Meloxicam, Penicillins, and Toradol    Problem List: Malia Mckeon does not have any pertinent problems on file.    Surgical History:  Past Surgical History:   Procedure Laterality Date   • CHOLECYSTECTOMY     • HYSTERECTOMY LAPAROSCOPY     • I & D PROCEDURE         Past Social Hx: Malia Mckeon  reports that she has never smoked. She has never used smokeless tobacco. She reports current alcohol use. She reports that she does not use drugs.     Past Family Hx:  Malia Mckeon family history includes Heart Disease in her mother; Hypertension in her mother.     Problem list, medications, and allergies reviewed by myself today in Epic.      "Objective:     /110   Pulse 88   Temp 36 °C (96.8 °F) (Temporal)   Resp 12   Ht 1.651 m (5' 5\")   Wt (!) 128 kg (282 lb)   SpO2 91%   BMI 46.93 kg/m²     Physical Exam  Vitals reviewed.   Constitutional:       Appearance: She is well-developed.   Eyes:      Conjunctiva/sclera: Conjunctivae normal.      Pupils: Pupils are equal, round, and reactive to light.   Cardiovascular:      Rate and Rhythm: Normal rate.   Pulmonary:      Effort: Pulmonary effort is normal.   Musculoskeletal:      Cervical back: Neck supple.   Feet:      Comments: Right Heel small less than 1 cm well-healing scabbed over wound with no surrounding erythema. No discharge.  Diffusely tender.  No palpable foreign body noted.  Without streaking or calf tenderness.    TTP to lateral, medial, and posterior heel.   There is chronic edema to legs and feet bilaterally.     Neurovascularly intact distally.  Capillary refill less than 2 seconds  Skin:     General: Skin is warm and dry.   Neurological:      General: No focal deficit present.      Mental Status: She is alert and oriented to person, place, and time.   Psychiatric:         Mood and Affect: Mood normal.         Diagnosis and associated orders:     1. Visit for wound check  sulfamethoxazole-trimethoprim (BACTRIM DS) 800-160 MG tablet   2. Pain of right heel        Comments/MDM:     • Suspect musculoskeletal pain/inflammation of the heel. However, will place on 5 day course Bactrim.   • Wound care discussed. Gentle soap and water every day. Protect the area.  • Encouraged elevation, low salt intake, ice application, ibuprofen, well supported wide shoes and good cushioning of the heel. Stretching.     I personally reviewed prior external notes and test results pertinent to today's visit. Supportive care, natural history, differential diagnoses, and indications for immediate follow-up discussed. Patient expresses understanding and agrees to plan. Patient denies any other questions or " concerns.     Advised the patient to follow-up with the primary care physician for recheck, reevaluation, and consideration of further management.    Time spent evaluating the patient was >30 minutes which included preparing for the visit, obtaining history, examination, ordering labs/tests/procedures/medications, independent interpretation, discussion of plan, counseling/education, medical information reconciliation, and documentation into chart.     Please note that this dictation was created using voice recognition software. I have made a reasonable attempt to correct obvious errors, but I expect that there are errors of grammar and possibly content that I did not discover before finalizing the note.    This note was electronically signed by Fidel Bay PA-C

## 2021-08-31 ENCOUNTER — APPOINTMENT (OUTPATIENT)
Dept: RADIOLOGY | Facility: MEDICAL CENTER | Age: 56
End: 2021-08-31
Attending: PHYSICIAN ASSISTANT
Payer: COMMERCIAL

## 2021-09-14 ENCOUNTER — HOSPITAL ENCOUNTER (OUTPATIENT)
Dept: RADIOLOGY | Facility: MEDICAL CENTER | Age: 56
End: 2021-09-14
Attending: PHYSICIAN ASSISTANT
Payer: COMMERCIAL

## 2021-09-14 DIAGNOSIS — R76.8 HEPATITIS B ANTIBODY POSITIVE: ICD-10-CM

## 2021-09-14 DIAGNOSIS — K74.00 HEPATIC FIBROSIS: ICD-10-CM

## 2021-09-14 DIAGNOSIS — B18.2 CHRONIC ACTIVE HEPATITIS C (HCC): ICD-10-CM

## 2021-09-14 DIAGNOSIS — R76.8 HEPATITIS B CORE ANTIBODY POSITIVE: ICD-10-CM

## 2021-09-14 PROCEDURE — 76981 USE PARENCHYMA: CPT

## 2021-09-23 ENCOUNTER — APPOINTMENT (OUTPATIENT)
Dept: RADIOLOGY | Facility: MEDICAL CENTER | Age: 56
End: 2021-09-23
Attending: PHYSICIAN ASSISTANT
Payer: COMMERCIAL

## 2021-09-23 ENCOUNTER — HOSPITAL ENCOUNTER (OUTPATIENT)
Dept: LAB | Facility: MEDICAL CENTER | Age: 56
End: 2021-09-23
Attending: FAMILY MEDICINE
Payer: COMMERCIAL

## 2021-09-23 ENCOUNTER — HOSPITAL ENCOUNTER (OUTPATIENT)
Dept: LAB | Facility: MEDICAL CENTER | Age: 56
End: 2021-09-23
Payer: COMMERCIAL

## 2021-09-23 DIAGNOSIS — Z11.1 SCREENING-PULMONARY TB: ICD-10-CM

## 2021-09-23 PROCEDURE — 36415 COLL VENOUS BLD VENIPUNCTURE: CPT

## 2021-09-23 PROCEDURE — 87900 PHENOTYPE INFECT AGENT DRUG: CPT

## 2021-09-23 PROCEDURE — 87902 NFCT AGT GNTYP ALYS HEP C: CPT

## 2021-09-23 PROCEDURE — 86480 TB TEST CELL IMMUN MEASURE: CPT

## 2021-09-25 LAB
GAMMA INTERFERON BACKGROUND BLD IA-ACNC: 0.03 IU/ML
M TB IFN-G BLD-IMP: NEGATIVE
M TB IFN-G CD4+ BCKGRND COR BLD-ACNC: 0 IU/ML
MITOGEN IGNF BCKGRD COR BLD-ACNC: >10 IU/ML
QFT TB2 - NIL TBQ2: 0 IU/ML

## 2021-10-13 PROBLEM — B18.1 HEPATITIS B, CHRONIC (HCC): Status: ACTIVE | Noted: 2021-09-20

## 2021-10-13 PROBLEM — K74.69 OTHER CIRRHOSIS OF LIVER (HCC): Status: ACTIVE | Noted: 2021-09-20

## 2021-10-13 PROBLEM — K76.0 FATTY LIVER: Status: ACTIVE | Noted: 2021-09-20

## 2021-10-13 PROBLEM — B18.2 HEP C W/O COMA, CHRONIC (HCC): Status: ACTIVE | Noted: 2021-09-20

## 2021-10-14 LAB — MISCELLANEOUS LAB RESULT MISCLAB: NORMAL

## 2021-12-01 ENCOUNTER — TELEPHONE (OUTPATIENT)
Dept: CARDIOLOGY | Facility: MEDICAL CENTER | Age: 56
End: 2021-12-01

## 2021-12-01 ENCOUNTER — HOSPITAL ENCOUNTER (OUTPATIENT)
Dept: LAB | Facility: MEDICAL CENTER | Age: 56
End: 2021-12-01
Attending: INTERNAL MEDICINE
Payer: COMMERCIAL

## 2021-12-01 LAB
ALBUMIN SERPL BCP-MCNC: 4.1 G/DL (ref 3.2–4.9)
ALBUMIN/GLOB SERPL: 1.2 G/DL
ALP SERPL-CCNC: 130 U/L (ref 30–99)
ALT SERPL-CCNC: 72 U/L (ref 2–50)
ANION GAP SERPL CALC-SCNC: 11 MMOL/L (ref 7–16)
AST SERPL-CCNC: 61 U/L (ref 12–45)
BASOPHILS # BLD AUTO: 0.7 % (ref 0–1.8)
BASOPHILS # BLD: 0.04 K/UL (ref 0–0.12)
BILIRUB SERPL-MCNC: 0.4 MG/DL (ref 0.1–1.5)
BUN SERPL-MCNC: 11 MG/DL (ref 8–22)
CALCIUM SERPL-MCNC: 8.9 MG/DL (ref 8.4–10.2)
CHLORIDE SERPL-SCNC: 104 MMOL/L (ref 96–112)
CO2 SERPL-SCNC: 25 MMOL/L (ref 20–33)
CREAT SERPL-MCNC: 0.6 MG/DL (ref 0.5–1.4)
EOSINOPHIL # BLD AUTO: 0.22 K/UL (ref 0–0.51)
EOSINOPHIL NFR BLD: 3.7 % (ref 0–6.9)
ERYTHROCYTE [DISTWIDTH] IN BLOOD BY AUTOMATED COUNT: 46.2 FL (ref 35.9–50)
GLOBULIN SER CALC-MCNC: 3.5 G/DL (ref 1.9–3.5)
GLUCOSE SERPL-MCNC: 87 MG/DL (ref 65–99)
HCT VFR BLD AUTO: 44.3 % (ref 37–47)
HGB BLD-MCNC: 14.3 G/DL (ref 12–16)
IMM GRANULOCYTES # BLD AUTO: 0.01 K/UL (ref 0–0.11)
IMM GRANULOCYTES NFR BLD AUTO: 0.2 % (ref 0–0.9)
LYMPHOCYTES # BLD AUTO: 1.7 K/UL (ref 1–4.8)
LYMPHOCYTES NFR BLD: 28.3 % (ref 22–41)
MCH RBC QN AUTO: 28.8 PG (ref 27–33)
MCHC RBC AUTO-ENTMCNC: 32.3 G/DL (ref 33.6–35)
MCV RBC AUTO: 89.1 FL (ref 81.4–97.8)
MONOCYTES # BLD AUTO: 0.57 K/UL (ref 0–0.85)
MONOCYTES NFR BLD AUTO: 9.5 % (ref 0–13.4)
NEUTROPHILS # BLD AUTO: 3.47 K/UL (ref 2–7.15)
NEUTROPHILS NFR BLD: 57.6 % (ref 44–72)
NRBC # BLD AUTO: 0 K/UL
NRBC BLD-RTO: 0 /100 WBC
PLATELET # BLD AUTO: 222 K/UL (ref 164–446)
PMV BLD AUTO: 10.3 FL (ref 9–12.9)
POTASSIUM SERPL-SCNC: 4 MMOL/L (ref 3.6–5.5)
PROT SERPL-MCNC: 7.6 G/DL (ref 6–8.2)
RBC # BLD AUTO: 4.97 M/UL (ref 4.2–5.4)
SODIUM SERPL-SCNC: 140 MMOL/L (ref 135–145)
WBC # BLD AUTO: 6 K/UL (ref 4.8–10.8)

## 2021-12-01 PROCEDURE — 87522 HEPATITIS C REVRS TRNSCRPJ: CPT

## 2021-12-01 PROCEDURE — 80053 COMPREHEN METABOLIC PANEL: CPT

## 2021-12-01 PROCEDURE — 85025 COMPLETE CBC W/AUTO DIFF WBC: CPT

## 2021-12-01 PROCEDURE — 36415 COLL VENOUS BLD VENIPUNCTURE: CPT

## 2021-12-01 NOTE — TELEPHONE ENCOUNTER
Chart Prep      Called pt to see if they have seen a cardiologist before or if she has any recent cardiac testing or imaging but she stated that she has not but pt do have some recent lab work on file. Pt was reminded on her appt date and time.

## 2021-12-05 LAB
HCV RNA SERPL NAA+PROBE-ACNC: NOT DETECTED IU/ML
HCV RNA SERPL NAA+PROBE-LOG IU: NOT DETECTED LOG IU/ML
HCV RNA SERPL QL NAA+PROBE: NOT DETECTED

## 2021-12-06 ENCOUNTER — TELEMEDICINE (OUTPATIENT)
Dept: CARDIOLOGY | Facility: MEDICAL CENTER | Age: 56
End: 2021-12-06
Payer: COMMERCIAL

## 2021-12-06 VITALS
HEART RATE: 113 BPM | WEIGHT: 280 LBS | SYSTOLIC BLOOD PRESSURE: 161 MMHG | HEIGHT: 67 IN | OXYGEN SATURATION: 94 % | DIASTOLIC BLOOD PRESSURE: 96 MMHG | BODY MASS INDEX: 43.95 KG/M2

## 2021-12-06 DIAGNOSIS — B18.2 HEP C W/O COMA, CHRONIC (HCC): ICD-10-CM

## 2021-12-06 DIAGNOSIS — R60.0 BILATERAL LOWER EXTREMITY EDEMA: ICD-10-CM

## 2021-12-06 DIAGNOSIS — I10 PRIMARY HYPERTENSION: ICD-10-CM

## 2021-12-06 DIAGNOSIS — R73.03 PREDIABETES: ICD-10-CM

## 2021-12-06 DIAGNOSIS — Z79.899 HIGH RISK MEDICATION USE: ICD-10-CM

## 2021-12-06 DIAGNOSIS — B18.1 HEPATITIS B, CHRONIC (HCC): ICD-10-CM

## 2021-12-06 DIAGNOSIS — I10 ESSENTIAL HYPERTENSION, BENIGN: ICD-10-CM

## 2021-12-06 PROBLEM — B19.20 VIRAL HEPATITIS C: Status: ACTIVE | Noted: 2021-09-20

## 2021-12-06 PROBLEM — E66.9 OBESITY: Status: ACTIVE | Noted: 2018-01-26

## 2021-12-06 PROBLEM — B19.10 HEPATITIS B VIRUS INFECTION: Status: ACTIVE | Noted: 2021-09-20

## 2021-12-06 PROBLEM — M19.90 OSTEOARTHROSIS: Status: ACTIVE | Noted: 2021-12-06

## 2021-12-06 PROBLEM — K21.9 GASTROESOPHAGEAL REFLUX DISEASE: Status: ACTIVE | Noted: 2021-12-06

## 2021-12-06 PROBLEM — J45.909 ASTHMA: Status: ACTIVE | Noted: 2021-12-06

## 2021-12-06 PROCEDURE — 99204 OFFICE O/P NEW MOD 45 MIN: CPT | Performed by: NURSE PRACTITIONER

## 2021-12-06 RX ORDER — SPIRONOLACTONE 25 MG/1
25 TABLET ORAL DAILY
Qty: 30 TABLET | Refills: 3 | Status: SHIPPED | OUTPATIENT
Start: 2021-12-06 | End: 2022-03-01

## 2021-12-06 RX ORDER — CARVEDILOL 12.5 MG/1
12.5 TABLET ORAL 2 TIMES DAILY WITH MEALS
Qty: 60 TABLET | Refills: 11 | Status: SHIPPED | OUTPATIENT
Start: 2021-12-06 | End: 2022-03-01 | Stop reason: SDUPTHER

## 2021-12-06 ASSESSMENT — ENCOUNTER SYMPTOMS
MYALGIAS: 0
PND: 0
FEVER: 1
PALPITATIONS: 0
TINGLING: 0
NAUSEA: 0
ABDOMINAL PAIN: 0
BLOOD IN STOOL: 0
CLAUDICATION: 0
BLURRED VISION: 0
LOSS OF CONSCIOUSNESS: 0
FALLS: 0
WEIGHT LOSS: 0
VOMITING: 0
DIZZINESS: 0
HEADACHES: 1
COUGH: 0
ORTHOPNEA: 0
SHORTNESS OF BREATH: 0
BRUISES/BLEEDS EASILY: 0

## 2021-12-06 ASSESSMENT — FIBROSIS 4 INDEX: FIB4 SCORE: 1.88

## 2021-12-06 NOTE — PROGRESS NOTES
Chief Complaint   Patient presents with   • HTN (Controlled)     This evaluation was conducted via Zoom using secure and encrypted videoconferencing technology. The patient was in a private location in the state of [place].    The patient's identity was confirmed and verbal consent was obtained for this virtual visit.    Subjective     Shakira Mckeon is a 56 y.o. female who presents today heart failure new patient referral from her primary care provider Dr. Colin for hypertension and reported LVH noted on her EKG.     In addition to above patient has following medical problems including chronic hepatitis C without hepatic coma currently on treatment followed by GI for potential liver transplant (DSE?), chronic viral hepatitis B, and DVT.    Cardiovascular Risk Factors:  1. Smoking status: Never  2. Type II Diabetes Mellitus: No  3. Hypertension: Yes  4. Dyslipidemia: unknown. Will check  5. Family history of early Coronary Artery Disease in a first degree relative (Male less than 55 years of age; Female less than 65 years of age): Denies  6.  Obesity and/or Metabolic Syndrome: BMI 43  7. Sedentary lifestyle: Walks frequently, denies aerobic exercising  8. Substance Abuse (ETOH, Caffine, Recreational substances): never  9. Hx of CKD or autoimmune diseases (lupus or RA): never    Today, patient reports fever and concerns of having the flu currently.  Patient reports that she has been having bilateral lower extremity edema since prior to her current illness symptoms that is now extending into her upper extremities.  Patient reports she has had high blood pressure approximately 160s/90s for the last 2 years.  Patient reports low-sodium diet.  Otherwise, patient denies chest pain, shortness of breath, palpitations, dizziness/lightheadedness, orthopnea, PND.     Patient reports she is currently being treated for her hepatitis C secondary to tattoo.  She is also being treated for viral hepatitis B after needlestick.  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    We reviewed her EKG findings, most recent lab results, medications and her current vital signs.  We will have her complete an echocardiogram.  We will also initiate spironolactone and transition her atenolol to carvedilol.  We discussed the importance of monitoring weights at home daily. Call office if weight increasing greater than 3 lbs in 1 day or greater than 5 lbs in 1 week.  Patient verbalizes understanding and agreeable to plan.  She also complete lipid panel today as well as nonfasting BMP in 2 weeks.  We will also provide educational materials regarding low-sodium and heart healthy diet.    Past Medical History:   Diagnosis Date   • ASTHMA    • Cellulitis    • COVID-19    • DVT (deep venous thrombosis) (HCC)    • Gangrene (HCC)     \"from a DVT\"   • Hypertension    • Muscle disorder    • Spider bite      Past Surgical History:   Procedure Laterality Date   • CHOLECYSTECTOMY     • HYSTERECTOMY LAPAROSCOPY     • I & D PROCEDURE       Family History   Problem Relation Age of Onset   • Heart Disease Mother    • Hypertension Mother      Social History     Socioeconomic History   • Marital status:      Spouse name: Not on file   • Number of children: Not on file   • Years of education: Not on file   • Highest education level: Not on file   Occupational History   • Not on file   Tobacco Use   • Smoking status: Never Smoker   • Smokeless tobacco: Never Used   Vaping Use   • Vaping Use: Never used   Substance and Sexual Activity   • Alcohol use: Yes   • Drug use: No   • Sexual activity: Not on file   Other Topics Concern   • Not on file   Social History Narrative   • Not on file     Social Determinants of Health     Financial Resource Strain:    • Difficulty of Paying Living Expenses: Not on file   Food Insecurity:    • Worried About Running Out of Food in the Last Year: Not on file   • Ran Out of Food in the Last Year: Not on file   Transportation " Needs:    • Lack of Transportation (Medical): Not on file   • Lack of Transportation (Non-Medical): Not on file   Physical Activity:    • Days of Exercise per Week: Not on file   • Minutes of Exercise per Session: Not on file   Stress:    • Feeling of Stress : Not on file   Social Connections:    • Frequency of Communication with Friends and Family: Not on file   • Frequency of Social Gatherings with Friends and Family: Not on file   • Attends Gnosticism Services: Not on file   • Active Member of Clubs or Organizations: Not on file   • Attends Club or Organization Meetings: Not on file   • Marital Status: Not on file   Intimate Partner Violence:    • Fear of Current or Ex-Partner: Not on file   • Emotionally Abused: Not on file   • Physically Abused: Not on file   • Sexually Abused: Not on file   Housing Stability:    • Unable to Pay for Housing in the Last Year: Not on file   • Number of Places Lived in the Last Year: Not on file   • Unstable Housing in the Last Year: Not on file     Allergies   Allergen Reactions   • Cephalexin Rash, Itching and Nausea     Pt reported   • Clindamycin    • Covid-19 Mrna Vacc (Moderna) [ Covid-19 Mrna Vacc (Moderna)]      Face swelling,    • Iodine Rash     needs benadryl with CT scan   • Keflex    • Ketorolac Tromethamine      Other reaction(s): Other (See Comments)  Stroke    • Latex    • Meloxicam Hives   • Penicillins    • Toradol      Stroke      Outpatient Encounter Medications as of 12/6/2021   Medication Sig Dispense Refill   • Aspirin-Acetaminophen-Caffeine (EXCEDRIN PO) Take  by mouth as needed.     • spironolactone (ALDACTONE) 25 MG Tab Take 1 Tablet by mouth every day. 30 Tablet 3   • carvedilol (COREG) 12.5 MG Tab Take 1 Tablet by mouth 2 times a day with meals. 60 Tablet 11   • Sofosbuvir-Velpatasvir (EPCLUSA) 400-100 MG Tab Take 1 Tablet by mouth every day.     • Meclizine HCl 25 MG Chew Tab      • ondansetron (ZOFRAN) 8 MG Tab Take 1 Tablet by mouth every 8 hours as  "needed for Nausea/Vomiting. 30 Tablet 0   • mupirocin (BACTROBAN) 2 % Ointment Apply 1 Application topically 2 times a day. 22 g 0   • torsemide (DEMADEX) 10 MG tablet Take 1 Tablet by mouth every day. 30 Tablet 3   • tizanidine (ZANAFLEX) 4 MG Tab TAKE 1 TABLET BY MOUTH IN THE EVENING AS NEEDED FOR MUSCLE SPASM 30 tablet 1   • [DISCONTINUED] Rimegepant Sulfate (NURTEC) 75 MG TABLET DISPERSIBLE Take 1 Tablet by mouth 1 time a day as needed. (Patient not taking: Reported on 12/6/2021) 2 Tablet 0   • [DISCONTINUED] pantoprazole (PROTONIX) 40 MG Tablet Delayed Response Take 40 mg by mouth every day. (Patient not taking: Reported on 12/6/2021)     • [DISCONTINUED] atenolol (TENORMIN) 25 MG Tab Take 1 tablet by mouth 2 times a day. 60 tablet 1     No facility-administered encounter medications on file as of 12/6/2021.     Review of Systems   Constitutional: Positive for fever and malaise/fatigue. Negative for weight loss.   Eyes: Negative for blurred vision.   Respiratory: Negative for cough and shortness of breath.    Cardiovascular: Positive for leg swelling. Negative for chest pain, palpitations, orthopnea, claudication and PND.   Gastrointestinal: Negative for abdominal pain, blood in stool, nausea and vomiting.   Genitourinary: Negative for dysuria, frequency and hematuria.   Musculoskeletal: Negative for falls and myalgias.   Neurological: Positive for headaches. Negative for dizziness, tingling and loss of consciousness.   Endo/Heme/Allergies: Does not bruise/bleed easily.              Objective     BP (!) 161/96 (BP Location: Left arm, Patient Position: Sitting, BP Cuff Size: Adult)   Pulse (!) 113   Ht 1.702 m (5' 7\")   Wt (!) 127 kg (280 lb)   SpO2 94%   BMI 43.85 kg/m²     Physical Exam  Physical Exam:  Constitutional: Alert, no distress, well-groomed.  Skin: No rashes in visible areas.  Eye: Round. Conjunctiva clear, lids normal. No icterus.   ENMT: Lips pink without lesions, good dentition, moist mucous " membranes. Phonation normal.  Neck: No masses, no thyromegaly. Moves freely without pain.  CV: Pulse as reported by patient  Respiratory: Unlabored respiratory effort, no cough or audible wheeze  Psych: Alert and oriented x3, normal affect and mood.          No results found for: CHOLSTRLTOT, LDL, HDL, TRIGLYCERIDE    Lab Results   Component Value Date/Time    SODIUM 140 12/01/2021 12:56 PM    POTASSIUM 4.0 12/01/2021 12:56 PM    CHLORIDE 104 12/01/2021 12:56 PM    CO2 25 12/01/2021 12:56 PM    GLUCOSE 87 12/01/2021 12:56 PM    BUN 11 12/01/2021 12:56 PM    CREATININE 0.60 12/01/2021 12:56 PM    BUNCREATRAT 17 04/17/2020 11:16 AM     Lab Results   Component Value Date/Time    ALKPHOSPHAT 130 (H) 12/01/2021 12:56 PM    ASTSGOT 61 (H) 12/01/2021 12:56 PM    ALTSGPT 72 (H) 12/01/2021 12:56 PM    TBILIRUBIN 0.4 12/01/2021 12:56 PM      EKG (3/15/2021): Personally interpreted by me as sinus rhythm  Assessment & Plan     1. Bilateral lower extremity edema  EC-ECHOCARDIOGRAM COMPLETE W/O CONT    Basic Metabolic Panel    Lipid Profile   2. Primary hypertension  EC-ECHOCARDIOGRAM COMPLETE W/O CONT    Basic Metabolic Panel    Lipid Profile   3. Hep C w/o coma, chronic (HCC)  EC-ECHOCARDIOGRAM COMPLETE W/O CONT    Basic Metabolic Panel    Lipid Profile       Medical Decision Making: Today's Assessment/Status/Plan:        Bilateral lower extremity edema; Reported LVH on EKG; hypertension  -Echocardiogram ordered  -Transition from atenolol to carvedilol  -Initiate spironolactone 25 mg daily.  BMP in 2 weeks  -Continue torsemide 10 mg daily  -Written information provided on after visit summary.  Patient to continue to monitor blood pressure as well as heart rate and daily weights.  Patient to contact office for persistent elevated blood pressure or decreased heart rate and significant weight changes per instructions.    BMI 43  -We will check LDLs for CV risk factor modification  -Patient continue to work on diet and lifestyle  changes  -Written diet education provided today.    Hep C; Hep B  -Epclusa  -Followed by GI    FU in clinic in 1 month. Sooner if needed.    Patient verbalizes understanding and agrees with the plan of care.     Collaborating MD: Dr. Kortney MD    I personally spent a total of 49 minutes which includes face-to-face time and non-face-to-face time spent on preparing to see the patient, reviewing prior notes and tests, obtaining history from the patient, performing a medically appropriate exam, counseling and educating the patient, ordering medications/tests/procedures/referrals as clinically indicated, and documenting information in the electronic medical record.    PLEASE NOTE: This Note was created using voice recognition Software. I have made every reasonable attempt to correct obvious errors, but I expect that there are errors of grammar and possibly content that I did not discover before finalizing the note

## 2021-12-06 NOTE — PATIENT INSTRUCTIONS
Check lipids with blood work today.    Stop atenolol. Start coreg. Monitor heart rate with blood pressure checks    Start spironolactone. Non-fasting blood work in 2 weeks    Echo (ultrasound of heart) ordered today. Schedule to complete in 1-2 months    Checking Blood Pressure:  -Blood pressure cuff, spend in the $40-65, with good return policy  -It should be automatic, upper arm, measure your arm to get the correct size, probably adult Large  -Put the cuff in place, rest arm on table near height of your heart, sit quietly for 5 min, legs uncrossed, with back support, then take your blood pressure, write it down, keep a log  -Check once a day. Ideally, 2 hours after medications.  -Can bring your cuff to at least one appointment where it can be calibrated to a manual cuff if you are concerned.  -Goal blood pressure is at least under 130/80, ideally under 120/80.  If you think your BP is overall too high, let us know in the office, we can adjust medications, can use [employer] or call the [place] office: [phone].  -If you feel dizzy, please check your blood pressure.  If your blood pressure is less than 90/60 with dizziness call our office at [phone].    -Continue to monitor blood pressures, heart rates, and daily weights in log provided in office today. Please bring to next appointment to review with provider.     Salt=sodium=sea salt, guidelines say stay under 2,500 mg daily   Get salt smart, start looking at labels, count it up.  Salt is hidden in everything, salad dressing, sauces, cheese, most canned food, any processed meat.    -Discussed the importance of monitoring weights at home daily. Call office if weight increasing greater than 3 lbs in 1 day or greater than 5 lbs in 1 week.

## 2021-12-08 ENCOUNTER — APPOINTMENT (OUTPATIENT)
Dept: RADIOLOGY | Facility: MEDICAL CENTER | Age: 56
End: 2021-12-08
Attending: OTOLARYNGOLOGY
Payer: COMMERCIAL

## 2021-12-08 DIAGNOSIS — G44.209 TENSION-TYPE HEADACHE, NOT INTRACTABLE, UNSPECIFIED CHRONICITY PATTERN: ICD-10-CM

## 2021-12-08 PROCEDURE — 70553 MRI BRAIN STEM W/O & W/DYE: CPT

## 2021-12-08 PROCEDURE — 700117 HCHG RX CONTRAST REV CODE 255: Performed by: OTOLARYNGOLOGY

## 2021-12-08 PROCEDURE — A9576 INJ PROHANCE MULTIPACK: HCPCS | Performed by: OTOLARYNGOLOGY

## 2021-12-08 RX ADMIN — GADOTERIDOL 20 ML: 279.3 INJECTION, SOLUTION INTRAVENOUS at 16:20

## 2022-02-03 ENCOUNTER — HOSPITAL ENCOUNTER (OUTPATIENT)
Dept: LAB | Facility: MEDICAL CENTER | Age: 57
End: 2022-02-03
Attending: FAMILY MEDICINE
Payer: COMMERCIAL

## 2022-02-03 DIAGNOSIS — B18.1 CHRONIC VIRAL HEPATITIS B WITHOUT DELTA AGENT AND WITHOUT COMA (HCC): ICD-10-CM

## 2022-02-03 PROCEDURE — 36415 COLL VENOUS BLD VENIPUNCTURE: CPT

## 2022-02-03 PROCEDURE — 87517 HEPATITIS B DNA QUANT: CPT

## 2022-02-06 LAB
HBV DNA SERPL NAA+PROBE-ACNC: NOT DETECTED IU/ML
HBV DNA SERPL NAA+PROBE-LOG IU: NOT DETECTED LOG IU/ML
HBV DNA SERPL QL NAA+PROBE: NOT DETECTED

## 2022-03-01 ENCOUNTER — OFFICE VISIT (OUTPATIENT)
Dept: CARDIOLOGY | Facility: MEDICAL CENTER | Age: 57
End: 2022-03-01
Payer: COMMERCIAL

## 2022-03-01 VITALS
HEART RATE: 95 BPM | SYSTOLIC BLOOD PRESSURE: 178 MMHG | HEIGHT: 67 IN | WEIGHT: 293 LBS | RESPIRATION RATE: 16 BRPM | OXYGEN SATURATION: 95 % | DIASTOLIC BLOOD PRESSURE: 98 MMHG | BODY MASS INDEX: 45.99 KG/M2

## 2022-03-01 DIAGNOSIS — R06.02 SHORTNESS OF BREATH: ICD-10-CM

## 2022-03-01 DIAGNOSIS — I10 ESSENTIAL HYPERTENSION, BENIGN: ICD-10-CM

## 2022-03-01 DIAGNOSIS — I10 PRIMARY HYPERTENSION: ICD-10-CM

## 2022-03-01 DIAGNOSIS — R60.0 LOWER EXTREMITY EDEMA: ICD-10-CM

## 2022-03-01 DIAGNOSIS — R60.0 BILATERAL LOWER EXTREMITY EDEMA: ICD-10-CM

## 2022-03-01 DIAGNOSIS — I10 ESSENTIAL HYPERTENSION: ICD-10-CM

## 2022-03-01 PROCEDURE — 99214 OFFICE O/P EST MOD 30 MIN: CPT | Performed by: NURSE PRACTITIONER

## 2022-03-01 RX ORDER — SPIRONOLACTONE 50 MG/1
50 TABLET, FILM COATED ORAL DAILY
Qty: 30 TABLET | Refills: 11 | Status: SHIPPED | OUTPATIENT
Start: 2022-03-01 | End: 2022-07-13

## 2022-03-01 RX ORDER — CARVEDILOL 25 MG/1
25 TABLET ORAL 2 TIMES DAILY WITH MEALS
Qty: 60 TABLET | Refills: 11 | Status: SHIPPED | OUTPATIENT
Start: 2022-03-01 | End: 2022-07-13

## 2022-03-01 RX ORDER — TORSEMIDE 10 MG/1
10 TABLET ORAL DAILY
Qty: 30 TABLET | Refills: 3 | Status: SHIPPED | OUTPATIENT
Start: 2022-03-01 | End: 2022-07-13

## 2022-03-01 ASSESSMENT — ENCOUNTER SYMPTOMS
CLAUDICATION: 0
BRUISES/BLEEDS EASILY: 0
PALPITATIONS: 0
LOSS OF CONSCIOUSNESS: 0
BLOOD IN STOOL: 0
ORTHOPNEA: 0
BLURRED VISION: 0
SHORTNESS OF BREATH: 1
TINGLING: 0
ABDOMINAL PAIN: 0
FALLS: 0
NAUSEA: 0
FEVER: 0
VOMITING: 0
DIZZINESS: 0
WEIGHT LOSS: 1
PND: 0
COUGH: 0
MYALGIAS: 0

## 2022-03-01 ASSESSMENT — FIBROSIS 4 INDEX: FIB4 SCORE: 1.88

## 2022-03-01 NOTE — PATIENT INSTRUCTIONS
Increase spironolactone    Increase coreg    Resume torsemide    Blood work in 2 weeks. 2 view chest xray. Call PCP about SOB    Blood pressure check in 2 weeks

## 2022-03-01 NOTE — PROGRESS NOTES
Chief Complaint   Patient presents with   • HTN (Controlled)     F/v dx: bilateral lower extremity       Subjective     Shakira Mckeon is a 56 y.o. female who presents today heart failure new patient referral from her primary care provider Dr. Colin for hypertension and reported LVH noted on her EKG.     In addition to above patient has following medical problems including chronic hepatitis C without hepatic coma currently on treatment followed by GI for chronic viral hepatitis B, and DVT.    Cardiovascular Risk Factors:  1. Smoking status: Never  2. Type II Diabetes Mellitus: No  3. Hypertension: Yes  4. Dyslipidemia: unknown. Will check  5. Family history of early Coronary Artery Disease in a first degree relative (Male less than 55 years of age; Female less than 65 years of age): Denies  6.  Obesity and/or Metabolic Syndrome: BMI 43  7. Sedentary lifestyle: Walks frequently, denies aerobic exercising  8. Substance Abuse (ETOH, Caffine, Recreational substances): never  9. Hx of CKD or autoimmune diseases (lupus or RA): never    Today, patient reports that she has made positive lifestyle changes with plant-based diet and increased physical activity that she has lost weight. However, patient reports increased bilateral lower extremity edema, mild headaches, intermittent SOB and hypertension over the last week since stopping her torsemide. Otherwise, patient denies chest pain, palpitations, dizziness/lightheadedness, orthopnea, PND.     Her blood pressure was rechecked throughout visit with final reading showing 178/98.  Patient reports her blood pressure at home has been similar.  We will have her resume her torsemide, increase her Coreg and spironolactone and return to clinic for blood pressure check and blood work in 2 weeks.  We reviewed cardiac diagnostic testing, discussed new onset shortness of breath could have additional etiologies other than cardiac, will obtain 2 view chest x-ray I recommend  "follow-up with PCP for noncardiac work-up.  Patient verbalizes understanding.  Questions answered.  Patient agreeable to plan.      Past Medical History:   Diagnosis Date   • ASTHMA    • Cellulitis    • COVID-19    • DVT (deep venous thrombosis) (HCC)    • Gangrene (HCC)     \"from a DVT\"   • Hypertension    • Muscle disorder    • Spider bite      Past Surgical History:   Procedure Laterality Date   • CHOLECYSTECTOMY     • HYSTERECTOMY LAPAROSCOPY     • I & D PROCEDURE       Family History   Problem Relation Age of Onset   • Heart Disease Mother    • Hypertension Mother      Social History     Socioeconomic History   • Marital status:      Spouse name: Not on file   • Number of children: Not on file   • Years of education: Not on file   • Highest education level: Not on file   Occupational History   • Not on file   Tobacco Use   • Smoking status: Never Smoker   • Smokeless tobacco: Never Used   Vaping Use   • Vaping Use: Never used   Substance and Sexual Activity   • Alcohol use: Yes   • Drug use: No   • Sexual activity: Not on file   Other Topics Concern   • Not on file   Social History Narrative   • Not on file     Social Determinants of Health     Financial Resource Strain: Not on file   Food Insecurity: Not on file   Transportation Needs: Not on file   Physical Activity: Not on file   Stress: Not on file   Social Connections: Not on file   Intimate Partner Violence: Not on file   Housing Stability: Not on file     Allergies   Allergen Reactions   • Cephalexin Rash, Itching and Nausea     Pt reported   • Clindamycin    • Covid-19 Mrna Vacc (Moderna) [ Covid-19 Mrna Vacc (Moderna)]      Face swelling,    • Iodine Rash     needs benadryl with CT scan   • Keflex    • Ketorolac Tromethamine      Other reaction(s): Other (See Comments)  Stroke    • Latex    • Meloxicam Hives   • Penicillins    • Toradol      Stroke      Outpatient Encounter Medications as of 3/1/2022   Medication Sig Dispense Refill   • " carvedilol (COREG) 25 MG Tab Take 1 Tablet by mouth 2 times a day with meals. 60 Tablet 11   • spironolactone (ALDACTONE) 50 MG Tab Take 1 Tablet by mouth every day. 30 Tablet 11   • torsemide (DEMADEX) 10 MG tablet Take 1 Tablet by mouth every day. 30 Tablet 3   • tizanidine (ZANAFLEX) 4 MG Tab TAKE 1 TABLET BY MOUTH IN THE EVENING AS NEEDED FOR MUSCLE SPASM 30 Tablet 0   • Meclizine HCl 25 MG Chew Tab      • ondansetron (ZOFRAN) 8 MG Tab Take 1 Tablet by mouth every 8 hours as needed for Nausea/Vomiting. 30 Tablet 0   • [DISCONTINUED] metOLazone (ZAROXOLYN) 5 MG Tab Take 1 Tablet by mouth every day. 60 Tablet 0   • [DISCONTINUED] methylPREDNISolone (MEDROL DOSEPAK) 4 MG Tablet Therapy Pack As directed on the packaging label. 21 Tablet 0   • [DISCONTINUED] Aspirin-Acetaminophen-Caffeine (EXCEDRIN PO) Take  by mouth as needed.     • [DISCONTINUED] spironolactone (ALDACTONE) 25 MG Tab Take 1 Tablet by mouth every day. 30 Tablet 3   • [DISCONTINUED] carvedilol (COREG) 12.5 MG Tab Take 1 Tablet by mouth 2 times a day with meals. 60 Tablet 11   • [DISCONTINUED] Sofosbuvir-Velpatasvir (EPCLUSA) 400-100 MG Tab Take 1 Tablet by mouth every day.     • [DISCONTINUED] torsemide (DEMADEX) 10 MG tablet Take 1 Tablet by mouth every day. 30 Tablet 3     No facility-administered encounter medications on file as of 3/1/2022.     Review of Systems   Constitutional: Positive for weight loss (desired). Negative for fever and malaise/fatigue.   Eyes: Negative for blurred vision.   Respiratory: Positive for shortness of breath (intermitent). Negative for cough.    Cardiovascular: Positive for leg swelling. Negative for chest pain, palpitations, orthopnea, claudication and PND.   Gastrointestinal: Negative for abdominal pain, blood in stool, nausea and vomiting.   Genitourinary: Negative for dysuria, frequency and hematuria.   Musculoskeletal: Negative for falls and myalgias.   Neurological: Negative for dizziness, tingling and loss of  "consciousness.   Endo/Heme/Allergies: Does not bruise/bleed easily.              Objective     BP (!) 178/98 (BP Location: Left arm, Patient Position: Sitting, BP Cuff Size: Adult)   Pulse 95   Resp 16   Ht 1.702 m (5' 7\")   Wt (!) 133 kg (294 lb)   SpO2 95%   BMI 46.05 kg/m²     Physical Exam  Vitals reviewed.   Constitutional:       Appearance: She is well-developed.   HENT:      Head: Normocephalic and atraumatic.   Eyes:      Pupils: Pupils are equal, round, and reactive to light.   Neck:      Vascular: No JVD.   Cardiovascular:      Rate and Rhythm: Normal rate and regular rhythm.      Pulses: Normal pulses.      Heart sounds: Normal heart sounds. No murmur heard.    No friction rub. No gallop.   Pulmonary:      Effort: Pulmonary effort is normal. No respiratory distress.      Breath sounds: Normal breath sounds.   Abdominal:      General: Bowel sounds are normal. There is no distension.      Palpations: Abdomen is soft.   Musculoskeletal:      Right lower leg: Edema present.      Left lower leg: Edema present.   Skin:     General: Skin is warm and dry.      Findings: No erythema.   Neurological:      General: No focal deficit present.      Mental Status: She is alert and oriented to person, place, and time.   Psychiatric:         Behavior: Behavior normal.            No results found for: CHOLSTRLTOT, LDL, HDL, TRIGLYCERIDE    Lab Results   Component Value Date/Time    SODIUM 140 12/01/2021 12:56 PM    POTASSIUM 4.0 12/01/2021 12:56 PM    CHLORIDE 104 12/01/2021 12:56 PM    CO2 25 12/01/2021 12:56 PM    GLUCOSE 87 12/01/2021 12:56 PM    BUN 11 12/01/2021 12:56 PM    CREATININE 0.60 12/01/2021 12:56 PM    BUNCREATRAT 17 04/17/2020 11:16 AM     Lab Results   Component Value Date/Time    ALKPHOSPHAT 130 (H) 12/01/2021 12:56 PM    ASTSGOT 61 (H) 12/01/2021 12:56 PM    ALTSGPT 72 (H) 12/01/2021 12:56 PM    TBILIRUBIN 0.4 12/01/2021 12:56 PM      EKG (3/15/2021): Personally interpreted by me as sinus " rhythm  Assessment & Plan     1. Primary hypertension  EC-ECHOCARDIOGRAM COMPLETE W/O CONT    DX-CHEST-2 VIEWS    Basic Metabolic Panel    Lipid Profile   2. Essential hypertension, benign  EC-ECHOCARDIOGRAM COMPLETE W/O CONT    DX-CHEST-2 VIEWS    Basic Metabolic Panel    Lipid Profile   3. Bilateral lower extremity edema  EC-ECHOCARDIOGRAM COMPLETE W/O CONT    DX-CHEST-2 VIEWS    Basic Metabolic Panel    Lipid Profile   4. BMI 39.0-39.9,adult  EC-ECHOCARDIOGRAM COMPLETE W/O CONT    DX-CHEST-2 VIEWS    Basic Metabolic Panel    Lipid Profile   5. Essential hypertension  torsemide (DEMADEX) 10 MG tablet    DX-CHEST-2 VIEWS    Basic Metabolic Panel    Lipid Profile   6. Lower extremity edema  torsemide (DEMADEX) 10 MG tablet    DX-CHEST-2 VIEWS    Basic Metabolic Panel    Lipid Profile   7. Shortness of breath         Medical Decision Making: Today's Assessment/Status/Plan:        Bilateral lower extremity edema; Reported LVH on EKG; hypertension  -Echocardiogram re-ordered  -Increase coreg to 25 mg BID  -Increase spironolactone 50 mg daily.  BMP in 2 weeks  -Resume torsemide 10 mg daily  -Written information provided on after visit summary.  Patient to continue to monitor blood pressure as well as heart rate and daily weights.  Patient to contact office for persistent elevated blood pressure or decreased heart rate and significant weight changes per instructions.    BMI 43  -We will check LDLs for CV risk factor modification  -Patient continue to work on diet and lifestyle changes  -Encouraged positive made already    Hep C; Hep B  -Completed Epclusa  -Followed by GI    FU in clinic in 3 months. Sooner if needed.    Patient verbalizes understanding and agrees with the plan of care.     I personally spent a total of 32 minutes which includes face-to-face time and non-face-to-face time spent on preparing to see the patient, reviewing prior notes and tests, obtaining history from the patient, performing a medically  appropriate exam, counseling and educating the patient, ordering medications/tests/procedures/referrals as clinically indicated, and documenting information in the electronic medical record.    PLEASE NOTE: This Note was created using voice recognition Software. I have made every reasonable attempt to correct obvious errors, but I expect that there are errors of grammar and possibly content that I did not discover before finalizing the note

## 2022-05-17 ENCOUNTER — HOSPITAL ENCOUNTER (OUTPATIENT)
Dept: LAB | Facility: MEDICAL CENTER | Age: 57
End: 2022-05-17
Attending: INTERNAL MEDICINE
Payer: COMMERCIAL

## 2022-05-17 LAB
ALBUMIN SERPL BCP-MCNC: 4.1 G/DL (ref 3.2–4.9)
ALBUMIN/GLOB SERPL: 1.2 G/DL
ALP SERPL-CCNC: 137 U/L (ref 30–99)
ALT SERPL-CCNC: 38 U/L (ref 2–50)
ANION GAP SERPL CALC-SCNC: 11 MMOL/L (ref 7–16)
AST SERPL-CCNC: 31 U/L (ref 12–45)
BASOPHILS # BLD AUTO: 0.8 % (ref 0–1.8)
BASOPHILS # BLD: 0.05 K/UL (ref 0–0.12)
BILIRUB SERPL-MCNC: 0.4 MG/DL (ref 0.1–1.5)
BUN SERPL-MCNC: 13 MG/DL (ref 8–22)
CALCIUM SERPL-MCNC: 9.3 MG/DL (ref 8.4–10.2)
CHLORIDE SERPL-SCNC: 99 MMOL/L (ref 96–112)
CO2 SERPL-SCNC: 27 MMOL/L (ref 20–33)
CREAT SERPL-MCNC: 0.79 MG/DL (ref 0.5–1.4)
EOSINOPHIL # BLD AUTO: 0.18 K/UL (ref 0–0.51)
EOSINOPHIL NFR BLD: 3 % (ref 0–6.9)
ERYTHROCYTE [DISTWIDTH] IN BLOOD BY AUTOMATED COUNT: 45.1 FL (ref 35.9–50)
GFR SERPLBLD CREATININE-BSD FMLA CKD-EPI: 86 ML/MIN/1.73 M 2
GLOBULIN SER CALC-MCNC: 3.5 G/DL (ref 1.9–3.5)
GLUCOSE SERPL-MCNC: 103 MG/DL (ref 65–99)
HCT VFR BLD AUTO: 45 % (ref 37–47)
HGB BLD-MCNC: 14.8 G/DL (ref 12–16)
IMM GRANULOCYTES # BLD AUTO: 0.02 K/UL (ref 0–0.11)
IMM GRANULOCYTES NFR BLD AUTO: 0.3 % (ref 0–0.9)
LYMPHOCYTES # BLD AUTO: 1.74 K/UL (ref 1–4.8)
LYMPHOCYTES NFR BLD: 28.9 % (ref 22–41)
MCH RBC QN AUTO: 28.7 PG (ref 27–33)
MCHC RBC AUTO-ENTMCNC: 32.9 G/DL (ref 33.6–35)
MCV RBC AUTO: 87.2 FL (ref 81.4–97.8)
MONOCYTES # BLD AUTO: 0.52 K/UL (ref 0–0.85)
MONOCYTES NFR BLD AUTO: 8.6 % (ref 0–13.4)
NEUTROPHILS # BLD AUTO: 3.52 K/UL (ref 2–7.15)
NEUTROPHILS NFR BLD: 58.4 % (ref 44–72)
NRBC # BLD AUTO: 0 K/UL
NRBC BLD-RTO: 0 /100 WBC
PLATELET # BLD AUTO: 225 K/UL (ref 164–446)
PMV BLD AUTO: 9.6 FL (ref 9–12.9)
POTASSIUM SERPL-SCNC: 3.8 MMOL/L (ref 3.6–5.5)
PROT SERPL-MCNC: 7.6 G/DL (ref 6–8.2)
RBC # BLD AUTO: 5.16 M/UL (ref 4.2–5.4)
SODIUM SERPL-SCNC: 137 MMOL/L (ref 135–145)
WBC # BLD AUTO: 6 K/UL (ref 4.8–10.8)

## 2022-05-17 PROCEDURE — 87522 HEPATITIS C REVRS TRNSCRPJ: CPT

## 2022-05-17 PROCEDURE — 80053 COMPREHEN METABOLIC PANEL: CPT

## 2022-05-17 PROCEDURE — 36415 COLL VENOUS BLD VENIPUNCTURE: CPT

## 2022-05-17 PROCEDURE — 85025 COMPLETE CBC W/AUTO DIFF WBC: CPT

## 2022-05-27 ENCOUNTER — TELEPHONE (OUTPATIENT)
Dept: CARDIOLOGY | Facility: MEDICAL CENTER | Age: 57
End: 2022-05-27
Payer: COMMERCIAL

## 2022-05-27 NOTE — TELEPHONE ENCOUNTER
Chart Prep      Tried to reach out to pt regarding standing lab work and echo but no answer. LVM stating pts appt date and time and call back number.

## 2022-07-13 PROBLEM — U07.1 COVID-19: Status: RESOLVED | Noted: 2020-11-01 | Resolved: 2022-07-13

## 2022-07-13 PROBLEM — B19.20 VIRAL HEPATITIS C: Status: RESOLVED | Noted: 2021-09-20 | Resolved: 2022-07-13

## 2022-07-13 PROBLEM — K76.0 FATTY LIVER: Status: RESOLVED | Noted: 2021-09-20 | Resolved: 2022-07-13

## 2022-07-13 PROBLEM — R74.8 ELEVATED LIVER ENZYMES: Status: RESOLVED | Noted: 2020-04-24 | Resolved: 2022-07-13

## 2022-09-26 ENCOUNTER — HOSPITAL ENCOUNTER (OUTPATIENT)
Dept: LAB | Facility: MEDICAL CENTER | Age: 57
End: 2022-09-26
Attending: FAMILY MEDICINE
Payer: COMMERCIAL

## 2022-09-26 DIAGNOSIS — R76.8 POSITIVE ANA (ANTINUCLEAR ANTIBODY): ICD-10-CM

## 2022-09-26 DIAGNOSIS — R79.89 POSITIVE D DIMER: ICD-10-CM

## 2022-09-26 DIAGNOSIS — I10 UNCONTROLLED HYPERTENSION: ICD-10-CM

## 2022-09-26 DIAGNOSIS — I50.811 ACUTE RIGHT-SIDED CONGESTIVE HEART FAILURE (HCC): ICD-10-CM

## 2022-09-26 LAB
ALBUMIN SERPL BCP-MCNC: 4.1 G/DL (ref 3.2–4.9)
ALBUMIN/GLOB SERPL: 1.3 G/DL
ALP SERPL-CCNC: 102 U/L (ref 30–99)
ALT SERPL-CCNC: 37 U/L (ref 2–50)
ANION GAP SERPL CALC-SCNC: 12 MMOL/L (ref 7–16)
AST SERPL-CCNC: 29 U/L (ref 12–45)
BILIRUB SERPL-MCNC: 0.4 MG/DL (ref 0.1–1.5)
BUN SERPL-MCNC: 16 MG/DL (ref 8–22)
CALCIUM SERPL-MCNC: 8.9 MG/DL (ref 8.4–10.2)
CHLORIDE SERPL-SCNC: 107 MMOL/L (ref 96–112)
CO2 SERPL-SCNC: 23 MMOL/L (ref 20–33)
CREAT SERPL-MCNC: 0.56 MG/DL (ref 0.5–1.4)
CRP SERPL HS-MCNC: <0.3 MG/DL (ref 0–0.75)
D DIMER PPP IA.FEU-MCNC: <0.27 UG/ML (FEU) (ref 0–0.5)
ERYTHROCYTE [SEDIMENTATION RATE] IN BLOOD BY WESTERGREN METHOD: 7 MM/HOUR (ref 0–25)
GFR SERPLBLD CREATININE-BSD FMLA CKD-EPI: 105 ML/MIN/1.73 M 2
GLOBULIN SER CALC-MCNC: 3.1 G/DL (ref 1.9–3.5)
GLUCOSE SERPL-MCNC: 103 MG/DL (ref 65–99)
NT-PROBNP SERPL IA-MCNC: 246 PG/ML (ref 0–125)
POTASSIUM SERPL-SCNC: 4 MMOL/L (ref 3.6–5.5)
PROT SERPL-MCNC: 7.2 G/DL (ref 6–8.2)
SODIUM SERPL-SCNC: 142 MMOL/L (ref 135–145)

## 2022-09-26 PROCEDURE — 86038 ANTINUCLEAR ANTIBODIES: CPT

## 2022-09-26 PROCEDURE — 83880 ASSAY OF NATRIURETIC PEPTIDE: CPT

## 2022-09-26 PROCEDURE — 85379 FIBRIN DEGRADATION QUANT: CPT

## 2022-09-26 PROCEDURE — 86140 C-REACTIVE PROTEIN: CPT

## 2022-09-26 PROCEDURE — 36415 COLL VENOUS BLD VENIPUNCTURE: CPT

## 2022-09-26 PROCEDURE — 85652 RBC SED RATE AUTOMATED: CPT

## 2022-09-26 PROCEDURE — 83516 IMMUNOASSAY NONANTIBODY: CPT | Mod: 91

## 2022-09-26 PROCEDURE — 86039 ANTINUCLEAR ANTIBODIES (ANA): CPT

## 2022-09-26 PROCEDURE — 80053 COMPREHEN METABOLIC PANEL: CPT

## 2022-09-28 LAB — NUCLEAR IGG SER QL IA: DETECTED

## 2022-09-29 LAB
ANA INTERPRETIVE COMMENT Q5143: ABNORMAL
ANA PATTERN Q5144: ABNORMAL
ANA TITER Q5145: ABNORMAL
ANTINUCLEAR ANTIBODY (ANA), HEP-2, IGG Q5142: DETECTED

## 2022-09-30 LAB
MYELOPEROXIDASE AB SER-ACNC: 1 AU/ML (ref 0–19)
PROTEINASE3 AB SER-ACNC: 10 AU/ML (ref 0–19)

## 2022-10-26 ENCOUNTER — HOSPITAL ENCOUNTER (OUTPATIENT)
Dept: LAB | Facility: MEDICAL CENTER | Age: 57
End: 2022-10-26
Attending: FAMILY MEDICINE
Payer: COMMERCIAL

## 2022-10-26 DIAGNOSIS — I89.0 LYMPHEDEMA: ICD-10-CM

## 2022-10-26 DIAGNOSIS — R73.9 HYPERGLYCEMIA: ICD-10-CM

## 2022-10-26 DIAGNOSIS — I10 SEVERE UNCONTROLLED HYPERTENSION: ICD-10-CM

## 2022-10-26 LAB
ALBUMIN SERPL BCP-MCNC: 4.1 G/DL (ref 3.2–4.9)
ALBUMIN/GLOB SERPL: 1.2 G/DL
ALP SERPL-CCNC: 119 U/L (ref 30–99)
ALT SERPL-CCNC: 43 U/L (ref 2–50)
ANION GAP SERPL CALC-SCNC: 11 MMOL/L (ref 7–16)
ANION GAP SERPL CALC-SCNC: 12 MMOL/L (ref 7–16)
AST SERPL-CCNC: 29 U/L (ref 12–45)
BASOPHILS # BLD AUTO: 0.6 % (ref 0–1.8)
BASOPHILS # BLD: 0.03 K/UL (ref 0–0.12)
BILIRUB SERPL-MCNC: 0.5 MG/DL (ref 0.1–1.5)
BUN SERPL-MCNC: 15 MG/DL (ref 8–22)
BUN SERPL-MCNC: 15 MG/DL (ref 8–22)
CALCIUM SERPL-MCNC: 9 MG/DL (ref 8.4–10.2)
CALCIUM SERPL-MCNC: 9.1 MG/DL (ref 8.4–10.2)
CHLORIDE SERPL-SCNC: 104 MMOL/L (ref 96–112)
CHLORIDE SERPL-SCNC: 104 MMOL/L (ref 96–112)
CO2 SERPL-SCNC: 24 MMOL/L (ref 20–33)
CO2 SERPL-SCNC: 25 MMOL/L (ref 20–33)
CREAT SERPL-MCNC: 0.66 MG/DL (ref 0.5–1.4)
CREAT SERPL-MCNC: 0.67 MG/DL (ref 0.5–1.4)
EOSINOPHIL # BLD AUTO: 0.17 K/UL (ref 0–0.51)
EOSINOPHIL NFR BLD: 3.7 % (ref 0–6.9)
ERYTHROCYTE [DISTWIDTH] IN BLOOD BY AUTOMATED COUNT: 44.8 FL (ref 35.9–50)
EST. AVERAGE GLUCOSE BLD GHB EST-MCNC: 117 MG/DL
GFR SERPLBLD CREATININE-BSD FMLA CKD-EPI: 101 ML/MIN/1.73 M 2
GFR SERPLBLD CREATININE-BSD FMLA CKD-EPI: 101 ML/MIN/1.73 M 2
GLOBULIN SER CALC-MCNC: 3.4 G/DL (ref 1.9–3.5)
GLUCOSE SERPL-MCNC: 95 MG/DL (ref 65–99)
GLUCOSE SERPL-MCNC: 96 MG/DL (ref 65–99)
HBA1C MFR BLD: 5.7 % (ref 4–5.6)
HCT VFR BLD AUTO: 43.1 % (ref 37–47)
HGB BLD-MCNC: 13.8 G/DL (ref 12–16)
IMM GRANULOCYTES # BLD AUTO: 0.01 K/UL (ref 0–0.11)
IMM GRANULOCYTES NFR BLD AUTO: 0.2 % (ref 0–0.9)
INR PPP: 0.97 (ref 0.87–1.13)
LYMPHOCYTES # BLD AUTO: 1.5 K/UL (ref 1–4.8)
LYMPHOCYTES NFR BLD: 32.3 % (ref 22–41)
MCH RBC QN AUTO: 28.6 PG (ref 27–33)
MCHC RBC AUTO-ENTMCNC: 32 G/DL (ref 33.6–35)
MCV RBC AUTO: 89.4 FL (ref 81.4–97.8)
MONOCYTES # BLD AUTO: 0.38 K/UL (ref 0–0.85)
MONOCYTES NFR BLD AUTO: 8.2 % (ref 0–13.4)
NEUTROPHILS # BLD AUTO: 2.56 K/UL (ref 2–7.15)
NEUTROPHILS NFR BLD: 55 % (ref 44–72)
NRBC # BLD AUTO: 0 K/UL
NRBC BLD-RTO: 0 /100 WBC
PLATELET # BLD AUTO: 200 K/UL (ref 164–446)
PMV BLD AUTO: 10 FL (ref 9–12.9)
POTASSIUM SERPL-SCNC: 4.2 MMOL/L (ref 3.6–5.5)
POTASSIUM SERPL-SCNC: 4.3 MMOL/L (ref 3.6–5.5)
PROT SERPL-MCNC: 7.5 G/DL (ref 6–8.2)
PROTHROMBIN TIME: 12.5 SEC (ref 12–14.6)
RBC # BLD AUTO: 4.82 M/UL (ref 4.2–5.4)
SODIUM SERPL-SCNC: 140 MMOL/L (ref 135–145)
SODIUM SERPL-SCNC: 140 MMOL/L (ref 135–145)
WBC # BLD AUTO: 4.7 K/UL (ref 4.8–10.8)

## 2022-10-26 PROCEDURE — 85610 PROTHROMBIN TIME: CPT

## 2022-10-26 PROCEDURE — 82105 ALPHA-FETOPROTEIN SERUM: CPT

## 2022-10-26 PROCEDURE — 36415 COLL VENOUS BLD VENIPUNCTURE: CPT

## 2022-10-26 PROCEDURE — 83036 HEMOGLOBIN GLYCOSYLATED A1C: CPT

## 2022-10-26 PROCEDURE — 85025 COMPLETE CBC W/AUTO DIFF WBC: CPT

## 2022-10-26 PROCEDURE — 80053 COMPREHEN METABOLIC PANEL: CPT

## 2022-10-26 PROCEDURE — 80048 BASIC METABOLIC PNL TOTAL CA: CPT

## 2022-10-28 LAB — AFP-TM SERPL-MCNC: 7 NG/ML (ref 0–9)

## 2022-10-30 ENCOUNTER — HOSPITAL ENCOUNTER (OUTPATIENT)
Dept: RADIOLOGY | Facility: MEDICAL CENTER | Age: 57
End: 2022-10-30
Attending: FAMILY MEDICINE
Payer: COMMERCIAL

## 2022-10-30 DIAGNOSIS — I10 UNCONTROLLED HYPERTENSION: ICD-10-CM

## 2022-10-30 PROCEDURE — 93975 VASCULAR STUDY: CPT

## 2022-11-07 ENCOUNTER — HOSPITAL ENCOUNTER (OUTPATIENT)
Dept: RADIOLOGY | Facility: MEDICAL CENTER | Age: 57
End: 2022-11-07
Attending: INTERNAL MEDICINE
Payer: COMMERCIAL

## 2022-11-08 ENCOUNTER — APPOINTMENT (OUTPATIENT)
Dept: RADIOLOGY | Facility: MEDICAL CENTER | Age: 57
End: 2022-11-08
Attending: INTERNAL MEDICINE
Payer: COMMERCIAL

## 2022-11-25 ENCOUNTER — APPOINTMENT (OUTPATIENT)
Dept: RADIOLOGY | Facility: MEDICAL CENTER | Age: 57
End: 2022-11-25
Attending: INTERNAL MEDICINE
Payer: COMMERCIAL

## 2022-12-12 ENCOUNTER — HOSPITAL ENCOUNTER (OUTPATIENT)
Dept: RADIOLOGY | Facility: MEDICAL CENTER | Age: 57
End: 2022-12-12
Attending: INTERNAL MEDICINE
Payer: COMMERCIAL

## 2022-12-12 DIAGNOSIS — K76.0 FATTY LIVER: ICD-10-CM

## 2022-12-12 DIAGNOSIS — K74.60 CIRRHOSIS OF LIVER WITHOUT ASCITES, UNSPECIFIED HEPATIC CIRRHOSIS TYPE (HCC): ICD-10-CM

## 2022-12-12 DIAGNOSIS — Z12.89 ENCOUNTER FOR SCREENING FOR MALIGNANT NEOPLASM OF OTHER SITES: ICD-10-CM

## 2022-12-12 DIAGNOSIS — R60.1 GENERALIZED EDEMA: ICD-10-CM

## 2022-12-12 PROCEDURE — 76700 US EXAM ABDOM COMPLETE: CPT

## 2023-02-02 ENCOUNTER — HOSPITAL ENCOUNTER (OUTPATIENT)
Dept: LAB | Facility: MEDICAL CENTER | Age: 58
End: 2023-02-02
Attending: INTERNAL MEDICINE
Payer: COMMERCIAL

## 2023-02-02 ENCOUNTER — HOSPITAL ENCOUNTER (OUTPATIENT)
Dept: RADIOLOGY | Facility: MEDICAL CENTER | Age: 58
End: 2023-02-02
Attending: NURSE PRACTITIONER
Payer: COMMERCIAL

## 2023-02-02 DIAGNOSIS — R06.00 DYSPNEA, UNSPECIFIED TYPE: ICD-10-CM

## 2023-02-02 DIAGNOSIS — R07.9 CHEST PAIN, UNSPECIFIED TYPE: ICD-10-CM

## 2023-02-02 LAB
ALBUMIN SERPL BCP-MCNC: 3.8 G/DL (ref 3.2–4.9)
ALBUMIN/GLOB SERPL: 1.2 G/DL
ALP SERPL-CCNC: 130 U/L (ref 30–99)
ALT SERPL-CCNC: 35 U/L (ref 2–50)
AMMONIA PLAS-SCNC: 26 UMOL/L (ref 11–45)
ANION GAP SERPL CALC-SCNC: 9 MMOL/L (ref 7–16)
AST SERPL-CCNC: 36 U/L (ref 12–45)
BASOPHILS # BLD AUTO: 0.8 % (ref 0–1.8)
BASOPHILS # BLD: 0.05 K/UL (ref 0–0.12)
BILIRUB SERPL-MCNC: 0.3 MG/DL (ref 0.1–1.5)
BUN SERPL-MCNC: 16 MG/DL (ref 8–22)
CALCIUM ALBUM COR SERPL-MCNC: 8.7 MG/DL (ref 8.5–10.5)
CALCIUM SERPL-MCNC: 8.5 MG/DL (ref 8.4–10.2)
CHLORIDE SERPL-SCNC: 105 MMOL/L (ref 96–112)
CO2 SERPL-SCNC: 24 MMOL/L (ref 20–33)
CREAT SERPL-MCNC: 0.59 MG/DL (ref 0.5–1.4)
EOSINOPHIL # BLD AUTO: 0.22 K/UL (ref 0–0.51)
EOSINOPHIL NFR BLD: 3.5 % (ref 0–6.9)
ERYTHROCYTE [DISTWIDTH] IN BLOOD BY AUTOMATED COUNT: 46.9 FL (ref 35.9–50)
GFR SERPLBLD CREATININE-BSD FMLA CKD-EPI: 104 ML/MIN/1.73 M 2
GLOBULIN SER CALC-MCNC: 3.3 G/DL (ref 1.9–3.5)
GLUCOSE SERPL-MCNC: 102 MG/DL (ref 65–99)
HCT VFR BLD AUTO: 41.1 % (ref 37–47)
HGB BLD-MCNC: 13.2 G/DL (ref 12–16)
IMM GRANULOCYTES # BLD AUTO: 0.02 K/UL (ref 0–0.11)
IMM GRANULOCYTES NFR BLD AUTO: 0.3 % (ref 0–0.9)
INR PPP: 1.09 (ref 0.87–1.13)
LYMPHOCYTES # BLD AUTO: 1.64 K/UL (ref 1–4.8)
LYMPHOCYTES NFR BLD: 26.5 % (ref 22–41)
MCH RBC QN AUTO: 27.6 PG (ref 27–33)
MCHC RBC AUTO-ENTMCNC: 32.1 G/DL (ref 33.6–35)
MCV RBC AUTO: 85.8 FL (ref 81.4–97.8)
MONOCYTES # BLD AUTO: 0.56 K/UL (ref 0–0.85)
MONOCYTES NFR BLD AUTO: 9 % (ref 0–13.4)
NEUTROPHILS # BLD AUTO: 3.71 K/UL (ref 2–7.15)
NEUTROPHILS NFR BLD: 59.9 % (ref 44–72)
NRBC # BLD AUTO: 0 K/UL
NRBC BLD-RTO: 0 /100 WBC
PLATELET # BLD AUTO: 266 K/UL (ref 164–446)
PMV BLD AUTO: 10.4 FL (ref 9–12.9)
POTASSIUM SERPL-SCNC: 4 MMOL/L (ref 3.6–5.5)
PROT SERPL-MCNC: 7.1 G/DL (ref 6–8.2)
PROTHROMBIN TIME: 14 SEC (ref 12–14.6)
RBC # BLD AUTO: 4.79 M/UL (ref 4.2–5.4)
SODIUM SERPL-SCNC: 138 MMOL/L (ref 135–145)
TROPONIN T SERPL-MCNC: 7 NG/L (ref 6–19)
TSH SERPL DL<=0.005 MIU/L-ACNC: 1.35 UIU/ML (ref 0.38–5.33)
WBC # BLD AUTO: 6.2 K/UL (ref 4.8–10.8)

## 2023-02-02 PROCEDURE — 36415 COLL VENOUS BLD VENIPUNCTURE: CPT

## 2023-02-02 PROCEDURE — 84484 ASSAY OF TROPONIN QUANT: CPT

## 2023-02-02 PROCEDURE — 83550 IRON BINDING TEST: CPT

## 2023-02-02 PROCEDURE — 86704 HEP B CORE ANTIBODY TOTAL: CPT

## 2023-02-02 PROCEDURE — 86381 MITOCHONDRIAL ANTIBODY EACH: CPT

## 2023-02-02 PROCEDURE — 83540 ASSAY OF IRON: CPT

## 2023-02-02 PROCEDURE — 86038 ANTINUCLEAR ANTIBODIES: CPT

## 2023-02-02 PROCEDURE — 87340 HEPATITIS B SURFACE AG IA: CPT

## 2023-02-02 PROCEDURE — 85025 COMPLETE CBC W/AUTO DIFF WBC: CPT

## 2023-02-02 PROCEDURE — 86708 HEPATITIS A ANTIBODY: CPT

## 2023-02-02 PROCEDURE — 82103 ALPHA-1-ANTITRYPSIN TOTAL: CPT

## 2023-02-02 PROCEDURE — 80053 COMPREHEN METABOLIC PANEL: CPT

## 2023-02-02 PROCEDURE — 86803 HEPATITIS C AB TEST: CPT

## 2023-02-02 PROCEDURE — 82390 ASSAY OF CERULOPLASMIN: CPT

## 2023-02-02 PROCEDURE — 86015 ACTIN ANTIBODY EACH: CPT

## 2023-02-02 PROCEDURE — 82728 ASSAY OF FERRITIN: CPT

## 2023-02-02 PROCEDURE — 86706 HEP B SURFACE ANTIBODY: CPT

## 2023-02-02 PROCEDURE — 82140 ASSAY OF AMMONIA: CPT

## 2023-02-02 PROCEDURE — 86039 ANTINUCLEAR ANTIBODIES (ANA): CPT

## 2023-02-02 PROCEDURE — 85610 PROTHROMBIN TIME: CPT

## 2023-02-02 PROCEDURE — 84443 ASSAY THYROID STIM HORMONE: CPT

## 2023-02-02 PROCEDURE — 71046 X-RAY EXAM CHEST 2 VIEWS: CPT

## 2023-02-02 PROCEDURE — 82784 ASSAY IGA/IGD/IGG/IGM EACH: CPT

## 2023-02-03 LAB
FERRITIN SERPL-MCNC: 28 NG/ML (ref 10–291)
HBV CORE AB SERPL QL IA: REACTIVE
HBV SURFACE AB SERPL IA-ACNC: 450 MIU/ML (ref 0–10)
HBV SURFACE AG SER QL: NORMAL
HCV AB SER QL: REACTIVE
IRON SATN MFR SERPL: 15 % (ref 15–55)
IRON SERPL-MCNC: 68 UG/DL (ref 40–170)
TIBC SERPL-MCNC: 467 UG/DL (ref 250–450)
UIBC SERPL-MCNC: 399 UG/DL (ref 110–370)

## 2023-02-04 LAB
A1AT SERPL-MCNC: 167 MG/DL (ref 90–200)
HAV AB SER QL IA: NEGATIVE
IGG SERPL-MCNC: 1212 MG/DL (ref 768–1632)
MITOCHONDRIA M2 IGG SER-ACNC: 30.8 UNITS (ref 0–24.9)
NUCLEAR IGG SER QL IA: DETECTED
SMA IGG SER-ACNC: 6 UNITS (ref 0–19)

## 2023-02-06 LAB
ANA INTERPRETIVE COMMENT Q5143: ABNORMAL
ANA PATTERN Q5144: ABNORMAL
ANA TITER Q5145: ABNORMAL
ANTINUCLEAR ANTIBODY (ANA), HEP-2, IGG Q5142: DETECTED
CERULOPLASMIN SERPL-MCNC: 29 MG/DL (ref 16–45)

## 2023-02-23 ENCOUNTER — PRE-ADMISSION TESTING (OUTPATIENT)
Dept: ADMISSIONS | Facility: MEDICAL CENTER | Age: 58
End: 2023-02-23
Attending: INTERNAL MEDICINE
Payer: COMMERCIAL

## 2023-02-28 ENCOUNTER — APPOINTMENT (OUTPATIENT)
Dept: RADIOLOGY | Facility: MEDICAL CENTER | Age: 58
End: 2023-02-28
Attending: INTERNAL MEDICINE
Payer: COMMERCIAL

## 2023-02-28 ENCOUNTER — APPOINTMENT (OUTPATIENT)
Dept: RADIOLOGY | Facility: MEDICAL CENTER | Age: 58
End: 2023-02-28
Attending: EMERGENCY MEDICINE
Payer: COMMERCIAL

## 2023-02-28 ENCOUNTER — HOSPITAL ENCOUNTER (EMERGENCY)
Facility: MEDICAL CENTER | Age: 58
End: 2023-02-28
Attending: EMERGENCY MEDICINE
Payer: COMMERCIAL

## 2023-02-28 ENCOUNTER — HOSPITAL ENCOUNTER (OUTPATIENT)
Facility: MEDICAL CENTER | Age: 58
End: 2023-02-28
Attending: INTERNAL MEDICINE | Admitting: RADIOLOGY
Payer: COMMERCIAL

## 2023-02-28 VITALS
OXYGEN SATURATION: 98 % | SYSTOLIC BLOOD PRESSURE: 150 MMHG | RESPIRATION RATE: 12 BRPM | HEIGHT: 65 IN | WEIGHT: 280 LBS | HEART RATE: 78 BPM | BODY MASS INDEX: 46.65 KG/M2 | DIASTOLIC BLOOD PRESSURE: 70 MMHG

## 2023-02-28 VITALS
HEART RATE: 81 BPM | OXYGEN SATURATION: 94 % | BODY MASS INDEX: 46.65 KG/M2 | DIASTOLIC BLOOD PRESSURE: 74 MMHG | WEIGHT: 280 LBS | TEMPERATURE: 98.4 F | RESPIRATION RATE: 20 BRPM | SYSTOLIC BLOOD PRESSURE: 165 MMHG | HEIGHT: 65 IN

## 2023-02-28 DIAGNOSIS — R11.0 NAUSEA: ICD-10-CM

## 2023-02-28 DIAGNOSIS — K74.60 CIRRHOSIS OF LIVER WITHOUT ASCITES, UNSPECIFIED HEPATIC CIRRHOSIS TYPE (HCC): ICD-10-CM

## 2023-02-28 DIAGNOSIS — G89.18 POST-OP PAIN: ICD-10-CM

## 2023-02-28 DIAGNOSIS — R07.9 CHEST PAIN, UNSPECIFIED TYPE: ICD-10-CM

## 2023-02-28 DIAGNOSIS — R76.8 POSITIVE ANA (ANTINUCLEAR ANTIBODY): ICD-10-CM

## 2023-02-28 DIAGNOSIS — R76.8 HEPATITIS B CORE ANTIBODY POSITIVE: ICD-10-CM

## 2023-02-28 LAB
ALBUMIN SERPL BCP-MCNC: 3.8 G/DL (ref 3.2–4.9)
ALBUMIN/GLOB SERPL: 1.2 G/DL
ALP SERPL-CCNC: 135 U/L (ref 30–99)
ALT SERPL-CCNC: 43 U/L (ref 2–50)
ANION GAP SERPL CALC-SCNC: 8 MMOL/L (ref 7–16)
AST SERPL-CCNC: 61 U/L (ref 12–45)
BASOPHILS # BLD AUTO: 0.6 % (ref 0–1.8)
BASOPHILS # BLD: 0.03 K/UL (ref 0–0.12)
BILIRUB SERPL-MCNC: 0.7 MG/DL (ref 0.1–1.5)
BUN SERPL-MCNC: 14 MG/DL (ref 8–22)
CALCIUM ALBUM COR SERPL-MCNC: 9.2 MG/DL (ref 8.5–10.5)
CALCIUM SERPL-MCNC: 9 MG/DL (ref 8.5–10.5)
CHLORIDE SERPL-SCNC: 106 MMOL/L (ref 96–112)
CO2 SERPL-SCNC: 27 MMOL/L (ref 20–33)
CREAT SERPL-MCNC: 0.71 MG/DL (ref 0.5–1.4)
EKG IMPRESSION: NORMAL
EOSINOPHIL # BLD AUTO: 0.21 K/UL (ref 0–0.51)
EOSINOPHIL NFR BLD: 4 % (ref 0–6.9)
ERYTHROCYTE [DISTWIDTH] IN BLOOD BY AUTOMATED COUNT: 48.5 FL (ref 35.9–50)
GFR SERPLBLD CREATININE-BSD FMLA CKD-EPI: 98 ML/MIN/1.73 M 2
GLOBULIN SER CALC-MCNC: 3.2 G/DL (ref 1.9–3.5)
GLUCOSE SERPL-MCNC: 100 MG/DL (ref 65–99)
HCT VFR BLD AUTO: 41 % (ref 37–47)
HGB BLD-MCNC: 13.3 G/DL (ref 12–16)
IMM GRANULOCYTES # BLD AUTO: 0.01 K/UL (ref 0–0.11)
IMM GRANULOCYTES NFR BLD AUTO: 0.2 % (ref 0–0.9)
LYMPHOCYTES # BLD AUTO: 1.34 K/UL (ref 1–4.8)
LYMPHOCYTES NFR BLD: 25.8 % (ref 22–41)
MCH RBC QN AUTO: 27.7 PG (ref 27–33)
MCHC RBC AUTO-ENTMCNC: 32.4 G/DL (ref 33.6–35)
MCV RBC AUTO: 85.4 FL (ref 81.4–97.8)
MONOCYTES # BLD AUTO: 0.3 K/UL (ref 0–0.85)
MONOCYTES NFR BLD AUTO: 5.8 % (ref 0–13.4)
NEUTROPHILS # BLD AUTO: 3.3 K/UL (ref 2–7.15)
NEUTROPHILS NFR BLD: 63.6 % (ref 44–72)
NRBC # BLD AUTO: 0 K/UL
NRBC BLD-RTO: 0 /100 WBC
PATHOLOGY CONSULT NOTE: NORMAL
PLATELET # BLD AUTO: 210 K/UL (ref 164–446)
PMV BLD AUTO: 10 FL (ref 9–12.9)
POTASSIUM SERPL-SCNC: 4.1 MMOL/L (ref 3.6–5.5)
PROT SERPL-MCNC: 7 G/DL (ref 6–8.2)
RBC # BLD AUTO: 4.8 M/UL (ref 4.2–5.4)
SODIUM SERPL-SCNC: 141 MMOL/L (ref 135–145)
TROPONIN T SERPL-MCNC: 9 NG/L (ref 6–19)
WBC # BLD AUTO: 5.2 K/UL (ref 4.8–10.8)

## 2023-02-28 PROCEDURE — A9270 NON-COVERED ITEM OR SERVICE: HCPCS

## 2023-02-28 PROCEDURE — 84484 ASSAY OF TROPONIN QUANT: CPT

## 2023-02-28 PROCEDURE — 700102 HCHG RX REV CODE 250 W/ 637 OVERRIDE(OP)

## 2023-02-28 PROCEDURE — 96374 THER/PROPH/DIAG INJ IV PUSH: CPT

## 2023-02-28 PROCEDURE — 700111 HCHG RX REV CODE 636 W/ 250 OVERRIDE (IP)

## 2023-02-28 PROCEDURE — 71045 X-RAY EXAM CHEST 1 VIEW: CPT

## 2023-02-28 PROCEDURE — 88342 IMHCHEM/IMCYTCHM 1ST ANTB: CPT

## 2023-02-28 PROCEDURE — 85025 COMPLETE CBC W/AUTO DIFF WBC: CPT

## 2023-02-28 PROCEDURE — 88307 TISSUE EXAM BY PATHOLOGIST: CPT

## 2023-02-28 PROCEDURE — 700117 HCHG RX CONTRAST REV CODE 255: Performed by: EMERGENCY MEDICINE

## 2023-02-28 PROCEDURE — 88313 SPECIAL STAINS GROUP 2: CPT

## 2023-02-28 PROCEDURE — 74177 CT ABD & PELVIS W/CONTRAST: CPT

## 2023-02-28 PROCEDURE — 76942 ECHO GUIDE FOR BIOPSY: CPT

## 2023-02-28 PROCEDURE — 36415 COLL VENOUS BLD VENIPUNCTURE: CPT

## 2023-02-28 PROCEDURE — 700111 HCHG RX REV CODE 636 W/ 250 OVERRIDE (IP): Performed by: EMERGENCY MEDICINE

## 2023-02-28 PROCEDURE — 47000 NEEDLE BIOPSY OF LIVER PERQ: CPT

## 2023-02-28 PROCEDURE — 99285 EMERGENCY DEPT VISIT HI MDM: CPT

## 2023-02-28 PROCEDURE — 80053 COMPREHEN METABOLIC PANEL: CPT

## 2023-02-28 PROCEDURE — 700111 HCHG RX REV CODE 636 W/ 250 OVERRIDE (IP): Performed by: RADIOLOGY

## 2023-02-28 PROCEDURE — 93005 ELECTROCARDIOGRAM TRACING: CPT | Performed by: EMERGENCY MEDICINE

## 2023-02-28 RX ORDER — ONDANSETRON 2 MG/ML
4 INJECTION INTRAMUSCULAR; INTRAVENOUS PRN
Status: DISCONTINUED | OUTPATIENT
Start: 2023-02-28 | End: 2023-02-28 | Stop reason: HOSPADM

## 2023-02-28 RX ORDER — MIDAZOLAM HYDROCHLORIDE 1 MG/ML
INJECTION INTRAMUSCULAR; INTRAVENOUS
Status: COMPLETED
Start: 2023-02-28 | End: 2023-02-28

## 2023-02-28 RX ORDER — MIDAZOLAM HYDROCHLORIDE 1 MG/ML
.5-2 INJECTION INTRAMUSCULAR; INTRAVENOUS PRN
Status: DISCONTINUED | OUTPATIENT
Start: 2023-02-28 | End: 2023-02-28 | Stop reason: HOSPADM

## 2023-02-28 RX ORDER — ONDANSETRON 2 MG/ML
INJECTION INTRAMUSCULAR; INTRAVENOUS
Status: COMPLETED
Start: 2023-02-28 | End: 2023-02-28

## 2023-02-28 RX ORDER — ONDANSETRON 4 MG/1
4 TABLET, ORALLY DISINTEGRATING ORAL EVERY 6 HOURS PRN
Qty: 10 TABLET | Refills: 0 | Status: SHIPPED | OUTPATIENT
Start: 2023-02-28 | End: 2023-08-23

## 2023-02-28 RX ORDER — DIPHENHYDRAMINE HYDROCHLORIDE 50 MG/ML
25 INJECTION INTRAMUSCULAR; INTRAVENOUS ONCE
Status: COMPLETED | OUTPATIENT
Start: 2023-02-28 | End: 2023-02-28

## 2023-02-28 RX ORDER — OXYCODONE HYDROCHLORIDE AND ACETAMINOPHEN 5; 325 MG/1; MG/1
TABLET ORAL
Status: COMPLETED
Start: 2023-02-28 | End: 2023-02-28

## 2023-02-28 RX ORDER — SODIUM CHLORIDE 9 MG/ML
500 INJECTION, SOLUTION INTRAVENOUS
Status: DISCONTINUED | OUTPATIENT
Start: 2023-02-28 | End: 2023-02-28 | Stop reason: HOSPADM

## 2023-02-28 RX ORDER — OXYCODONE HYDROCHLORIDE 10 MG/1
10 TABLET ORAL ONCE
Status: DISCONTINUED | OUTPATIENT
Start: 2023-02-28 | End: 2023-02-28 | Stop reason: HOSPADM

## 2023-02-28 RX ADMIN — FENTANYL CITRATE 50 MCG: 50 INJECTION, SOLUTION INTRAMUSCULAR; INTRAVENOUS at 08:54

## 2023-02-28 RX ADMIN — ONDANSETRON 4 MG: 2 INJECTION INTRAMUSCULAR; INTRAVENOUS at 08:40

## 2023-02-28 RX ADMIN — OXYCODONE HYDROCHLORIDE AND ACETAMINOPHEN 2 TABLET: 5; 325 TABLET ORAL at 09:15

## 2023-02-28 RX ADMIN — MIDAZOLAM HYDROCHLORIDE 2 MG: 1 INJECTION, SOLUTION INTRAMUSCULAR; INTRAVENOUS at 08:34

## 2023-02-28 RX ADMIN — FENTANYL CITRATE 50 MCG: 50 INJECTION, SOLUTION INTRAMUSCULAR; INTRAVENOUS at 08:34

## 2023-02-28 RX ADMIN — FENTANYL CITRATE 50 MCG: 50 INJECTION, SOLUTION INTRAMUSCULAR; INTRAVENOUS at 08:41

## 2023-02-28 RX ADMIN — IOHEXOL 100 ML: 350 INJECTION, SOLUTION INTRAVENOUS at 13:07

## 2023-02-28 RX ADMIN — MIDAZOLAM HYDROCHLORIDE 2 MG: 1 INJECTION INTRAMUSCULAR; INTRAVENOUS at 08:34

## 2023-02-28 RX ADMIN — FENTANYL CITRATE 50 MCG: 50 INJECTION, SOLUTION INTRAMUSCULAR; INTRAVENOUS at 08:47

## 2023-02-28 RX ADMIN — DIPHENHYDRAMINE HYDROCHLORIDE 25 MG: 50 INJECTION INTRAMUSCULAR; INTRAVENOUS at 11:33

## 2023-02-28 ASSESSMENT — FIBROSIS 4 INDEX
FIB4 SCORE: 1.35
FIB4 SCORE: 1.35

## 2023-02-28 NOTE — ED PROVIDER NOTES
ED Provider Note    CHIEF COMPLAINT  Chief Complaint   Patient presents with    Post-Op Complications    Chest Pain              EXTERNAL RECORDS REVIEWED  Inpatient Notes from liver biopsy this morning, initial report of biopsy is uneventful, however in postop, patient with increasing pain not controlled with oral medications    HPI/ROS  LIMITATION TO HISTORY   Select: : None  OUTSIDE HISTORIAN(S):  none    Malia Tamara Mckeon is a 57 y.o. female who presents with right sided upper chest pain.  Patient was in her normal state of health this morning, she went to IR for a liver biopsy for cirrhosis and reports that after the procedure she began having progressively worsening right-sided chest/shoulder pain.  The pain is sharp, is constant, no real radiation to her back, arms or otherwise.  She does report that the pain makes her feel short of breath otherwise no shortness of breath.  No recent illness fevers chills cough or congestion.  No leg pain or swelling.  She reports some mild abdominal pain near the site of the biopsy but otherwise no abdominal pain    PAST MEDICAL HISTORY   has a past medical history of ASTHMA (02/23/2023), Breath shortness (02/23/2023), Cellulitis, Congestive heart failure (HCC) (02/23/2023), COVID-19 (02/23/2023), Dental disorder (02/23/2023), DVT (deep venous thrombosis) (HCC) (02/23/2023), Gangrene (HCC), Heart burn (02/23/2023), Hepatitis B (02/23/2023), Hepatitis C (02/23/2023), Hypertension (02/23/2023), Muscle disorder, Resistant hypertension, Snoring (02/23/2023), and Spider bite.    SURGICAL HISTORY   has a past surgical history that includes hysterectomy laparoscopy; i & d procedure; cholecystectomy; other neurological surg (02/23/2023); and other orthopedic surgery (Left, 02/23/2023).    FAMILY HISTORY  Family History   Problem Relation Age of Onset    Heart Disease Mother     Hypertension Mother        SOCIAL HISTORY  Social History     Tobacco Use    Smoking status: Never  "   Smokeless tobacco: Never   Vaping Use    Vaping Use: Never used   Substance and Sexual Activity    Alcohol use: Not Currently    Drug use: Never    Sexual activity: Not on file       CURRENT MEDICATIONS  Home Medications    **Home medications have not yet been reviewed for this encounter**         ALLERGIES  Allergies   Allergen Reactions    Covid-19 Mrna Vacc (Moderna) [ Covid-19 Mrna Vacc (Moderna)] Swelling     Face swelling, hives, difficulty breathing     Iodine Shortness of Breath and Rash     needs benadryl with CT scan    Keflex Hives and Swelling     Tongue swelling    Latex Hives and Shortness of Breath    Penicillins Anaphylaxis    Toradol Unspecified     Stroke , generic as well     Carvedilol Nausea     nausea    Cephalexin Rash, Itching and Nausea     Pt reported    Clindamycin Unspecified     rash    Meloxicam Hives       PHYSICAL EXAM  VITAL SIGNS: BP (!) 148/70   Pulse 76   Temp 36.7 °C (98 °F) (Temporal)   Resp 19   Ht 1.651 m (5' 5\")   Wt (!) 127 kg (280 lb)   SpO2 98%   BMI 46.59 kg/m²      Pulse ox interpretation: I interpret this pulse ox as normal.  Constitutional: Alert in no apparent distress.  HENT: No signs of trauma, Bilateral external ears normal, Nose normal.   Eyes: Pupils are equal and reactive, Conjunctiva normal, Non-icteric.   Neck: Normal range of motion, No tenderness, Supple, No stridor.   Cardiovascular: Regular rate and rhythm, no murmurs.   Thorax & Lungs: Normal breath sounds, No respiratory distress, No wheezing, No chest tenderness.   Abdomen: Dressing in the epigastric area, clean dry intact with minimal tenderness, bowel sounds normal, Soft, No other tenderness, No masses, No pulsatile masses. No peritoneal signs.  Skin: Warm, Dry, No erythema, No rash.   Back: No bony tenderness, No CVA tenderness.   Extremities: Intact distal pulses, No edema, No tenderness, No cyanosis,  Negative Inés's sign.   Musculoskeletal: Good range of motion in all major joints. No " tenderness to palpation or major deformities noted.   Neurologic: Alert , Normal motor function, Normal sensory function, No focal deficits noted.   Psychiatric: Affect normal, Judgment normal, Mood normal.               DIAGNOSTIC STUDIES / PROCEDURES  Results for orders placed or performed during the hospital encounter of 02/28/23   CBC WITH DIFFERENTIAL   Result Value Ref Range    WBC 5.2 4.8 - 10.8 K/uL    RBC 4.80 4.20 - 5.40 M/uL    Hemoglobin 13.3 12.0 - 16.0 g/dL    Hematocrit 41.0 37.0 - 47.0 %    MCV 85.4 81.4 - 97.8 fL    MCH 27.7 27.0 - 33.0 pg    MCHC 32.4 (L) 33.6 - 35.0 g/dL    RDW 48.5 35.9 - 50.0 fL    Platelet Count 210 164 - 446 K/uL    MPV 10.0 9.0 - 12.9 fL    Neutrophils-Polys 63.60 44.00 - 72.00 %    Lymphocytes 25.80 22.00 - 41.00 %    Monocytes 5.80 0.00 - 13.40 %    Eosinophils 4.00 0.00 - 6.90 %    Basophils 0.60 0.00 - 1.80 %    Immature Granulocytes 0.20 0.00 - 0.90 %    Nucleated RBC 0.00 /100 WBC    Neutrophils (Absolute) 3.30 2.00 - 7.15 K/uL    Lymphs (Absolute) 1.34 1.00 - 4.80 K/uL    Monos (Absolute) 0.30 0.00 - 0.85 K/uL    Eos (Absolute) 0.21 0.00 - 0.51 K/uL    Baso (Absolute) 0.03 0.00 - 0.12 K/uL    Immature Granulocytes (abs) 0.01 0.00 - 0.11 K/uL    NRBC (Absolute) 0.00 K/uL   COMP METABOLIC PANEL   Result Value Ref Range    Sodium 141 135 - 145 mmol/L    Potassium 4.1 3.6 - 5.5 mmol/L    Chloride 106 96 - 112 mmol/L    Co2 27 20 - 33 mmol/L    Anion Gap 8.0 7.0 - 16.0    Glucose 100 (H) 65 - 99 mg/dL    Bun 14 8 - 22 mg/dL    Creatinine 0.71 0.50 - 1.40 mg/dL    Calcium 9.0 8.5 - 10.5 mg/dL    AST(SGOT) 61 (H) 12 - 45 U/L    ALT(SGPT) 43 2 - 50 U/L    Alkaline Phosphatase 135 (H) 30 - 99 U/L    Total Bilirubin 0.7 0.1 - 1.5 mg/dL    Albumin 3.8 3.2 - 4.9 g/dL    Total Protein 7.0 6.0 - 8.2 g/dL    Globulin 3.2 1.9 - 3.5 g/dL    A-G Ratio 1.2 g/dL   TROPONIN   Result Value Ref Range    Troponin T 9 6 - 19 ng/L   CORRECTED CALCIUM   Result Value Ref Range    Correct Calcium  9.2 8.5 - 10.5 mg/dL   ESTIMATED GFR   Result Value Ref Range    GFR (CKD-EPI) 98 >60 mL/min/1.73 m 2   EKG (Now)   Result Value Ref Range    Report       [facility]    Test Date:  2023  Pt Name:    MIROSLAVA MEADE            Department: ER  MRN:        [id number]                      Room:       [facility]  Gender:     Female                       Technician: [id number]  :        1965                   Requested By:AMANDA PÉREZ  Order #:    [id number]                    Reading MD: AMANDA PÉREZ MD    Measurements  Intervals                                Axis  Rate:       70                           P:          47  MN:         160                          QRS:        1  QRSD:       103                          T:          65  QT:         445  QTc:        481    Interpretive Statements  Sinus rhythm  Consider right atrial enlargement  Probable left ventricular hypertrophy  Compared to ECG 03/15/2021 14:10:02  No significant changes  Electronically Signed On 2023 13:49:29 PST by AMANDA PÉREZ MD           RADIOLOGY  I have independently interpreted the diagnostic imaging associated with this visit and am waiting the final reading from the radiologist.   My preliminary interpretation is a follows: No perforation  Radiologist interpretation:   CT-ABDOMEN-PELVIS WITH   Final Result      1.  Morphologic changes of chronic liver disease/cirrhosis.   2.  Changes from liver biopsy with scattered small amount of soft tissue gas. There is trace perihepatic and small amount of pelvic fluid, likely small amount of postbiopsy hemorrhage.      DX-CHEST-PORTABLE (1 VIEW)   Final Result      No acute cardiac or pulmonary abnormalities are identified.            COURSE & MEDICAL DECISION MAKING    ED Observation Status? Yes; I am placing the patient in to an observation status due to a diagnostic uncertainty as well as therapeutic intensity. Patient placed in observation status  at 10:28 AM, 2/28/2023.     Observation plan is as follows: Telemetry monitoring, serial abdominal exams    Upon Reevaluation, the patient's condition has: Improved; and will be discharged.    Patient discharged from ED Observation status at 148 PM (Time) 02/28/23   (Date).     INITIAL ASSESSMENT, COURSE AND PLAN  Care Narrative: 10:28 AM  Patient presenting with right-sided chest/shoulder pain after biopsy.  At this point differential includes acute processes such as pneumothorax, diaphragmatic injury, intra-abdominal bleed, ordered CT, chest x-ray, diagnostic labs, other acute processes also considered such as ACS although this seems unlikely given the nature of her symptoms and starting after the procedure and location, will order ECG, troponin, additionally considered pulmonary embolism but again this seems unlikely given this started after her procedure and she is not tachycardic or hypoxemic.    12:47 PM  Patient is reevaluated, reports no return of pain she is feeling much better, pending CT    1:48 PM  Patient was reevaluated, she continues to feel quite improved without any return of pain, updated on all results and she is agreeable to discharge          ADDITIONAL PROBLEM LIST  #1 postoperative pain.  At this point I think this was most likely related to her biopsy without evidence of dangerous complication at this point.  She did not actually require any additional pain medication in the emergency department after prolonged observation.  There is no return of her pain.  CT shows small amount of bleeding although no large bleeding or suggestion of complication, no evidence of perforation.  Reassuring labs and vitals and reassuring exam    #2  Chest pain.  I think this is likely referred pain after the biopsy with some irritation of her diaphragm.  CT shows no evidence of dangerous bleed or serious complication or injury.  X-ray without pneumothorax.  As above unlikely to be cardiac process with these  symptoms also with negative troponin and ECG    #3  Cirrhosis.  Chronic secondary to hepatitis exposure.  Contributing to her presentation today as this likely her biopsy.  No evidence of decompensation we will continue outpatient management  DISPOSITION AND DISCUSSIONS      Barriers to care at this time, including but not limited to:  norma .     Decision tools and prescription drugs considered including, but not limited to:  Prescription for Zofran .    The patient will return for new or worsening symptoms and is stable at the time of discharge.    The patient is referred to a primary physician for blood pressure management, diabetic screening, and for all other preventative health concerns.        DISPOSITION:  Patient will be discharged home in stable condition.    FOLLOW UP:  Noreen Colin M.D.  [address] [place] [address]  [phone]    In 1 week        OUTPATIENT MEDICATIONS:  New Prescriptions    ONDANSETRON (ZOFRAN ODT) 4 MG TABLET DISPERSIBLE    Take 1 Tablet by mouth every 6 hours as needed for Nausea/Vomiting.         FINAL DIAGNOSIS  1. Chest pain, unspecified type    2. Post-op pain    3. Nausea    4. Cirrhosis of liver without ascites, unspecified hepatic cirrhosis type (HCC)           Electronically signed by: Bryan Briones M.D., 2/28/2023 10:27 AM

## 2023-02-28 NOTE — ED TRIAGE NOTES
"Malia Mcdonald Linda 57 y.o. female bib IR staff from outpatient IR after liver biopsy.  Pt had non-targeted liver biopsy mid chest just under the sternum.  IR staff states patient had pain during and after procedure but reported pain much worse after the procedure.  Pt reports sharp constant pressure from area of biopsy up through chest and into R shoulder.  Pt was given a total of 200mcg fentanyl, 2mg versed and 2 percoets prior to coming to ED.    BP (!) 144/70   Pulse 79   Temp 36.7 °C (98 °F) (Temporal)   Resp 20   Ht 1.651 m (5' 5\")   Wt (!) 127 kg (280 lb)   SpO2 91%   BMI 46.59 kg/m²     "

## 2023-02-28 NOTE — ED NOTES
EKG in process.  Unable to pull blood from pta PIV.  Pt a difficult stick, requires US.  Assist RN bedside.

## 2023-02-28 NOTE — PROGRESS NOTES
[facility] Pt was consented by MD at bedside, confirmed by this RN and consent at bedside. Pt transferred to IR table in supine position. Patient underwent a liver biopsy by Dr. Dowling. Procedure site was marked by MD and verified using imaging guidance. Pt placed on monitor, prepped and draped in a sterile fashion. Vitals were taken every 5 minutes and remained stable during procedure (see doc flow sheet for results). CO2 waveform capnography was monitored and remained WNL throughout procedure. Report called to Reji COVARRUBIAS. [facility]. Core Labs X liver core  hand delivered to lab.      Pt will be monitored for 2 hrs in post op  Vs q 15      0912 Increasing pain vs monitored   Md notified   Ordered 2 percocets and to monitor vs   Possible ct abd    Pt taken to Er blue  22   Bedside report given in Er

## 2023-02-28 NOTE — OR SURGEON
Immediate Post- Operative Note        Findings: Left lobe liver biopsy      Procedure(s): Liver biopsy      Estimated Blood Loss: ~ 20 ml        Complications: None        2/28/2023     8:52 AM     Bailee Dowling M.D.

## 2023-02-28 NOTE — DISCHARGE INSTRUCTIONS
Your test today showed no dangerous cause for your symptoms.  There is a very small amount of bleeding from the biopsy but none that appears dangerous at this time.  The test looking at your chest were also normal.  Please follow-up with your primary care and seek more immediate medical attention for any new or uncontrolled pain, difficulty breathing or other concerns

## 2023-02-28 NOTE — ED NOTES
Pt and spouse given d/c instructions, f/u info and aware of RX x 1 for  with verbal understanding.  VSS at discharge.  PIV d/c'd with tip intact.  Pt dressed independently.  Pt taken from the ED via w/c accompanied by spouse, driving pt home.  All belongings in possession at discharge.

## 2023-03-01 ENCOUNTER — APPOINTMENT (OUTPATIENT)
Dept: RADIOLOGY | Facility: MEDICAL CENTER | Age: 58
End: 2023-03-01
Attending: EMERGENCY MEDICINE
Payer: COMMERCIAL

## 2023-03-01 ENCOUNTER — HOSPITAL ENCOUNTER (EMERGENCY)
Facility: MEDICAL CENTER | Age: 58
End: 2023-03-02
Attending: EMERGENCY MEDICINE
Payer: COMMERCIAL

## 2023-03-01 DIAGNOSIS — G89.18 POST-OPERATIVE PAIN: ICD-10-CM

## 2023-03-01 DIAGNOSIS — R07.81 PLEURITIC CHEST PAIN: ICD-10-CM

## 2023-03-01 LAB
ALBUMIN SERPL BCP-MCNC: 3.9 G/DL (ref 3.2–4.9)
ALBUMIN/GLOB SERPL: 1.1 G/DL
ALP SERPL-CCNC: 177 U/L (ref 30–99)
ALT SERPL-CCNC: 109 U/L (ref 2–50)
ANION GAP SERPL CALC-SCNC: 13 MMOL/L (ref 7–16)
AST SERPL-CCNC: 83 U/L (ref 12–45)
BASOPHILS # BLD AUTO: 0.5 % (ref 0–1.8)
BASOPHILS # BLD: 0.03 K/UL (ref 0–0.12)
BILIRUB SERPL-MCNC: 0.5 MG/DL (ref 0.1–1.5)
BUN SERPL-MCNC: 14 MG/DL (ref 8–22)
CALCIUM ALBUM COR SERPL-MCNC: 8.8 MG/DL (ref 8.5–10.5)
CALCIUM SERPL-MCNC: 8.7 MG/DL (ref 8.5–10.5)
CHLORIDE SERPL-SCNC: 105 MMOL/L (ref 96–112)
CO2 SERPL-SCNC: 22 MMOL/L (ref 20–33)
CREAT SERPL-MCNC: 0.79 MG/DL (ref 0.5–1.4)
EKG IMPRESSION: NORMAL
EOSINOPHIL # BLD AUTO: 0.25 K/UL (ref 0–0.51)
EOSINOPHIL NFR BLD: 4.2 % (ref 0–6.9)
ERYTHROCYTE [DISTWIDTH] IN BLOOD BY AUTOMATED COUNT: 50.7 FL (ref 35.9–50)
GFR SERPLBLD CREATININE-BSD FMLA CKD-EPI: 86 ML/MIN/1.73 M 2
GLOBULIN SER CALC-MCNC: 3.6 G/DL (ref 1.9–3.5)
GLUCOSE SERPL-MCNC: 105 MG/DL (ref 65–99)
HCT VFR BLD AUTO: 42.1 % (ref 37–47)
HGB BLD-MCNC: 13.2 G/DL (ref 12–16)
IMM GRANULOCYTES # BLD AUTO: 0.01 K/UL (ref 0–0.11)
IMM GRANULOCYTES NFR BLD AUTO: 0.2 % (ref 0–0.9)
LYMPHOCYTES # BLD AUTO: 1.53 K/UL (ref 1–4.8)
LYMPHOCYTES NFR BLD: 25.8 % (ref 22–41)
MCH RBC QN AUTO: 27.6 PG (ref 27–33)
MCHC RBC AUTO-ENTMCNC: 31.4 G/DL (ref 33.6–35)
MCV RBC AUTO: 87.9 FL (ref 81.4–97.8)
MONOCYTES # BLD AUTO: 0.62 K/UL (ref 0–0.85)
MONOCYTES NFR BLD AUTO: 10.4 % (ref 0–13.4)
NEUTROPHILS # BLD AUTO: 3.5 K/UL (ref 2–7.15)
NEUTROPHILS NFR BLD: 58.9 % (ref 44–72)
NRBC # BLD AUTO: 0 K/UL
NRBC BLD-RTO: 0 /100 WBC
PLATELET # BLD AUTO: 154 K/UL (ref 164–446)
PMV BLD AUTO: 10.8 FL (ref 9–12.9)
POTASSIUM SERPL-SCNC: 4.3 MMOL/L (ref 3.6–5.5)
PROT SERPL-MCNC: 7.5 G/DL (ref 6–8.2)
RBC # BLD AUTO: 4.79 M/UL (ref 4.2–5.4)
SODIUM SERPL-SCNC: 140 MMOL/L (ref 135–145)
TROPONIN T SERPL-MCNC: 7 NG/L (ref 6–19)
TROPONIN T SERPL-MCNC: 9 NG/L (ref 6–19)
WBC # BLD AUTO: 5.9 K/UL (ref 4.8–10.8)

## 2023-03-01 PROCEDURE — 71275 CT ANGIOGRAPHY CHEST: CPT

## 2023-03-01 PROCEDURE — 80053 COMPREHEN METABOLIC PANEL: CPT

## 2023-03-01 PROCEDURE — 93005 ELECTROCARDIOGRAM TRACING: CPT

## 2023-03-01 PROCEDURE — 99285 EMERGENCY DEPT VISIT HI MDM: CPT

## 2023-03-01 PROCEDURE — 36415 COLL VENOUS BLD VENIPUNCTURE: CPT

## 2023-03-01 PROCEDURE — 93005 ELECTROCARDIOGRAM TRACING: CPT | Performed by: EMERGENCY MEDICINE

## 2023-03-01 PROCEDURE — 96375 TX/PRO/DX INJ NEW DRUG ADDON: CPT

## 2023-03-01 PROCEDURE — 84484 ASSAY OF TROPONIN QUANT: CPT

## 2023-03-01 PROCEDURE — 700117 HCHG RX CONTRAST REV CODE 255: Performed by: EMERGENCY MEDICINE

## 2023-03-01 PROCEDURE — 96374 THER/PROPH/DIAG INJ IV PUSH: CPT

## 2023-03-01 PROCEDURE — 700111 HCHG RX REV CODE 636 W/ 250 OVERRIDE (IP): Performed by: EMERGENCY MEDICINE

## 2023-03-01 PROCEDURE — 71045 X-RAY EXAM CHEST 1 VIEW: CPT

## 2023-03-01 PROCEDURE — 74177 CT ABD & PELVIS W/CONTRAST: CPT

## 2023-03-01 PROCEDURE — 85025 COMPLETE CBC W/AUTO DIFF WBC: CPT

## 2023-03-01 RX ORDER — MORPHINE SULFATE 4 MG/ML
4 INJECTION INTRAVENOUS ONCE
Status: COMPLETED | OUTPATIENT
Start: 2023-03-01 | End: 2023-03-01

## 2023-03-01 RX ORDER — DIPHENHYDRAMINE HYDROCHLORIDE 50 MG/ML
50 INJECTION INTRAMUSCULAR; INTRAVENOUS ONCE
Status: COMPLETED | OUTPATIENT
Start: 2023-03-01 | End: 2023-03-01

## 2023-03-01 RX ADMIN — DIPHENHYDRAMINE HYDROCHLORIDE 50 MG: 50 INJECTION INTRAMUSCULAR; INTRAVENOUS at 22:00

## 2023-03-01 RX ADMIN — IOHEXOL 97 ML: 350 INJECTION, SOLUTION INTRAVENOUS at 22:35

## 2023-03-01 RX ADMIN — MORPHINE SULFATE 4 MG: 4 INJECTION INTRAVENOUS at 21:43

## 2023-03-01 ASSESSMENT — PAIN DESCRIPTION - PAIN TYPE
TYPE: ACUTE PAIN
TYPE: ACUTE PAIN

## 2023-03-01 ASSESSMENT — FIBROSIS 4 INDEX: FIB4 SCORE: 2.61

## 2023-03-02 ENCOUNTER — HOSPITAL ENCOUNTER (OUTPATIENT)
Dept: LAB | Facility: MEDICAL CENTER | Age: 58
End: 2023-03-02
Attending: EMERGENCY MEDICINE
Payer: COMMERCIAL

## 2023-03-02 VITALS
OXYGEN SATURATION: 99 % | TEMPERATURE: 98 F | SYSTOLIC BLOOD PRESSURE: 154 MMHG | HEART RATE: 91 BPM | WEIGHT: 293 LBS | BODY MASS INDEX: 48.82 KG/M2 | HEIGHT: 65 IN | RESPIRATION RATE: 18 BRPM | DIASTOLIC BLOOD PRESSURE: 65 MMHG

## 2023-03-02 LAB
HBV CORE IGM SER QL: NORMAL
HCV AB SER QL: REACTIVE

## 2023-03-02 PROCEDURE — A9270 NON-COVERED ITEM OR SERVICE: HCPCS | Performed by: EMERGENCY MEDICINE

## 2023-03-02 PROCEDURE — 86704 HEP B CORE ANTIBODY TOTAL: CPT

## 2023-03-02 PROCEDURE — 86803 HEPATITIS C AB TEST: CPT

## 2023-03-02 PROCEDURE — 36415 COLL VENOUS BLD VENIPUNCTURE: CPT

## 2023-03-02 PROCEDURE — 87522 HEPATITIS C REVRS TRNSCRPJ: CPT

## 2023-03-02 PROCEDURE — 700102 HCHG RX REV CODE 250 W/ 637 OVERRIDE(OP): Performed by: EMERGENCY MEDICINE

## 2023-03-02 PROCEDURE — 86705 HEP B CORE ANTIBODY IGM: CPT

## 2023-03-02 RX ORDER — OXYCODONE HYDROCHLORIDE 5 MG/1
5 TABLET ORAL ONCE
Status: COMPLETED | OUTPATIENT
Start: 2023-03-02 | End: 2023-03-02

## 2023-03-02 RX ORDER — OXYCODONE HYDROCHLORIDE 5 MG/1
5 TABLET ORAL EVERY 6 HOURS PRN
Qty: 9 TABLET | Refills: 0 | Status: SHIPPED | OUTPATIENT
Start: 2023-03-02 | End: 2023-03-05

## 2023-03-02 RX ADMIN — OXYCODONE 5 MG: 5 TABLET ORAL at 00:28

## 2023-03-02 NOTE — ED NOTES
Patient provided discharge instructions. Patient verbalized understanding. Patient leaving ER in stable condition. Patient ambulatory with steady gait.  IV removed.

## 2023-03-02 NOTE — ED PROVIDER NOTES
ED Provider Note    CHIEF COMPLAINT  Chief Complaint   Patient presents with    Post-Op Complications     Pt reports having a liver biopsy yesterday with severe pain, pain is continuing today.       EXTERNAL RECORDS REVIEWED  Outpatient Notes was seen yesterday for a liver biopsy and then followed up by Dr. Briones in the emergency department for chest pain had a CT abdomen pelvis which showed cirrhotic liver trace area of both hepatic fluid and a little bit of pelvic fluid without active bleed and a small amount of soft tissue gas which is normal post biopsy    HPI/ROS  LIMITATION TO HISTORY   Select: : None  OUTSIDE HISTORIAN(S):  Family who states the patient did not sleep last night due to pain    Malia Mckeon is a 57 y.o. female who presents to the emergency department with continued right upper quadrant pain and now chest pain.  Pain is definitely worse with laughing and deep breaths she feels like she cannot quite catch her breath.  She is been taking Tylenol at home without relief of her symptoms.  No nausea no vomiting no fevers no chills.  She states the pain goes from her upper abdomen right upper quadrant and then up into her left chest.  No leg swelling no nausea no vomiting she has been able to tolerate some orals since discharge yesterday.  She states she spoke with Dr. Ghotra who after reviewing the images told her to come back to the hospital.  Pain is currently an 8 out of 10 and sharp in nature    PAST MEDICAL HISTORY   has a past medical history of ASTHMA (02/23/2023), Breath shortness (02/23/2023), Cellulitis, Congestive heart failure (HCC) (02/23/2023), COVID-19 (02/23/2023), Dental disorder (02/23/2023), DVT (deep venous thrombosis) (HCC) (02/23/2023), Gangrene (HCC), Heart burn (02/23/2023), Hepatitis B (02/23/2023), Hepatitis C (02/23/2023), Hypertension (02/23/2023), Muscle disorder, Resistant hypertension, Snoring (02/23/2023), and Spider bite.    SURGICAL HISTORY   has a past  "surgical history that includes hysterectomy laparoscopy; i & d procedure; cholecystectomy; other neurological surg (02/23/2023); and other orthopedic surgery (Left, 02/23/2023).    FAMILY HISTORY  Family History   Problem Relation Age of Onset    Heart Disease Mother     Hypertension Mother        SOCIAL HISTORY  Social History     Tobacco Use    Smoking status: Never    Smokeless tobacco: Never   Vaping Use    Vaping Use: Never used   Substance and Sexual Activity    Alcohol use: Not Currently    Drug use: Never    Sexual activity: Not on file       CURRENT MEDICATIONS  Home Medications       Reviewed by Isabell Saunders R.N. (Registered Nurse) on 03/01/23 at 1940  Med List Status: Not Addressed     Medication Last Dose Status   Carboxymeth-Cellulose-CitricAc (PLENITY WELCOME KIT) Cap  Active   doxycycline (VIBRAMYCIN) 100 MG Tab  Active   losartan (COZAAR) 100 MG Tab  Active   methylPREDNISolone (MEDROL DOSEPAK) 4 MG Tablet Therapy Pack  Active   nebivolol (BYSTOLIC) 10 MG Tab  Active   ondansetron (ZOFRAN ODT) 4 MG TABLET DISPERSIBLE  Active   pantoprazole (PROTONIX) 40 MG Tablet Delayed Response  Active   spironolactone (ALDACTONE) 100 MG Tab  Active   tizanidine (ZANAFLEX) 4 MG Tab  Active   torsemide (DEMADEX) 20 MG Tab  Active                    ALLERGIES  Allergies   Allergen Reactions    Covid-19 Mrna Vacc (Moderna) [ Covid-19 Mrna Vacc (Moderna)] Swelling     Face swelling, hives, difficulty breathing     Iodine Shortness of Breath and Rash     needs benadryl with CT scan    Keflex Hives and Swelling     Tongue swelling    Latex Hives and Shortness of Breath    Penicillins Anaphylaxis    Toradol Unspecified     Stroke , generic as well     Carvedilol Nausea     nausea    Cephalexin Rash, Itching and Nausea     Pt reported    Clindamycin Unspecified     rash    Meloxicam Hives       PHYSICAL EXAM  VITAL SIGNS: BP (!) 165/96   Pulse 100   Temp 36.4 °C (97.6 °F) (Temporal)   Resp 18   Ht 1.651 m (5' 5\")  "  Wt (!) 141 kg (311 lb 4.6 oz)   SpO2 93% Comment: [place] ir  BMI 51.80 kg/m²    Pulse Ox Interpretation:   Pulse Ox is normal on room air    Constitutional: Alert in no apparent distress.  Obese female  HENT: Normocephalic atraumatic, MMM  Eyes: PER, Conjunctiva normal, Non-icteric.   Neck: Normal range of motion, No tenderness, Supple, No stridor.   Cardiovascular: Regular rate and rhythm, no murmurs.   Thorax & Lungs: Diminished breath sounds at the bases some central chest wall tenderness no respiratory distress, No wheezing   Abdomen: Bowel sounds normal, Soft, the small incision site in the epigastric from the biopsy is clean dry and intact she definitely has some tenderness and a little bit of distention in that area with some localized guarding but no rebound no pulsatile masses. No peritoneal signs.  Skin: Warm, Dry, No erythema, No rash.   Back: No bony tenderness, No CVA tenderness.   Extremities/MSK: Intact equal distal pulses, No edema, No tenderness, No cyanosis, no major deformities noted  Neurologic: Alert and oriented x3, No focal deficits noted.         DIAGNOSTIC STUDIES / PROCEDURES  EKG  I have independently interpreted this EKG  Results for orders placed or performed during the hospital encounter of 23   EKG (NOW)   Result Value Ref Range    Report       [facility] Emergency Dept.    Test Date:  2023  Pt Name:    MIROSLAVA MEADE            Department: ER  MRN:        [id number]                      Room:  Gender:     Female                       Technician: [id number]  :        [id number]                   Requested By:ER TRIAGE PROTOCOL  Order #:    [id number]                    Reading MD: Lainey Soto MD    Measurements  Intervals                                Axis  Rate:       93                           P:          43  DC:         164                          QRS:        5  QRSD:       103                          T:          66  QT:         386  QTc:         481    Interpretive Statements  Sinus rhythm at a rate of 93 no ST elevations or ST depressions no abnormal T  wave inversions or Q waves normal intervals normal axis LVH is present  Compared to ECG 02/28/2023 10:53:00  No significant changes  Electronically Signed On 3-1-2023 21:15:25 PST by Lainey Soto MD           LABS  Labs Reviewed   CBC WITH DIFFERENTIAL - Abnormal; Notable for the following components:       Result Value    MCHC 31.4 (*)     RDW 50.7 (*)     Platelet Count 154 (*)     All other components within normal limits    Narrative:     Biotin intake of greater than 5 mg per day may interfere with  troponin levels, causing false low values.   COMP METABOLIC PANEL - Abnormal; Notable for the following components:    Glucose 105 (*)     AST(SGOT) 83 (*)     ALT(SGPT) 109 (*)     Alkaline Phosphatase 177 (*)     Globulin 3.6 (*)     All other components within normal limits    Narrative:     Biotin intake of greater than 5 mg per day may interfere with  troponin levels, causing false low values.   TROPONIN    Narrative:     Biotin intake of greater than 5 mg per day may interfere with  troponin levels, causing false low values.   TROPONIN    Narrative:     Biotin intake of greater than 5 mg per day may interfere with  troponin levels, causing false low values.   CORRECTED CALCIUM    Narrative:     Biotin intake of greater than 5 mg per day may interfere with  troponin levels, causing false low values.   ESTIMATED GFR    Narrative:     Biotin intake of greater than 5 mg per day may interfere with  troponin levels, causing false low values.   HGB UNCHANGED from 2/28  troponin WNL       RADIOLOGY  I have independently interpreted the diagnostic imaging associated with this visit and am waiting the final reading from the radiologist.   My preliminary interpretation is a follows:     CTPA no large PE appreciated nor PNA  CT abd pelvis - no active bleed or new or worsening free fluid     Radiologist  interpretation:   CT-ABDOMEN-PELVIS WITH   Final Result         1.  Irregular hepatic contour compatible changes of cirrhosis   2.  Foci of intra-abdominal free air and air along the soft tissues of the anterior abdominal wall, decreased since prior study, given history likely residual air from recent liver biopsy.   3.  Trace free fluid in the pelvis, decreased since prior study   4.  Fat-containing bilateral inguinal hernias      CT-CTA CHEST PULMONARY ARTERY W/ RECONS   Final Result         1.  No large central pulmonary embolus is appreciated, evaluation of the subsegmental branches is essentially nondiagnostic due to motion artifacts. Additional imaging would be required for definitive exclusion of small distal pulmonary emboli.   2.  Hazy linear densities in the lung bases favor changes of atelectasis   3.  Atherosclerosis and atherosclerotic coronary artery disease.      DX-CHEST-PORTABLE (1 VIEW)   Final Result         No acute cardiac or pulmonary abnormality is identified.   Probable discoid atelectasis right lung base.            COURSE & MEDICAL DECISION MAKING    ED Observation Status? Yes; I am placing the patient in to an observation status due to a diagnostic uncertainty as well as therapeutic intensity. Patient placed in observation status at 8:46 PM, 3/1/2023.     Observation plan is as follows: Repeat blood work CT scans to evaluate for worsening bleeding infection or PE    Upon Reevaluation, the patient's condition has: Improved; and will be discharged.    Patient discharged from ED Observation status at 12:12 AM (Time) 03/02/23  (Date).     INITIAL ASSESSMENT, COURSE AND PLAN  Care Narrative: Patient had a CT scan which is normal postoperatively a little bit of postoperative bleed without active hemorrhage and soft tissue gas from where the biopsy was performed which is also normal because it was the same day as the procedure.  She is now complaining of continued worsening pain this is likely  pleurisy and the fact that she did have some small hematoma like fluid and free fluid in the belly could also be an atypical PE.  Patient will be treated with pain management as well as repeat CT scans including one of the chest and repeat abdomen pelvis for further evaluation.  Especially as well as a hemoglobin to evaluate for drop in her RBCs with possible bleed    12:12 AM  I reassessed patient at the bedside.  With ambulation she is no longer desaturating there is no evidence of pulmonary embolism nor pneumonia she does have a little bit of atelectasis and there is no changes in her abdomen actually improving changes there is no evidence of active bleeding or infectious process and her laboratory analysis is unremarkable.  Patient feels better with the morphine she will be given some oral pain management at home which I think is just the biggest deal and then she follows up with her liver specialist tomorrow.      HTN/IDDM FOLLOW UP:  The patient has known hypertension and is being followed by their primary care doctor      ADDITIONAL PROBLEM LIST  Post operative pain   Pleurisy     DISPOSITION AND DISCUSSIONS    Patient presented with continued postoperative pain status post liver biopsy.  CT scan yesterday was pretty normal and the same day of postoperative setting with normal findings mild postoperative bleed without active blush no evidence of infectious process today I ruled her out for pulmonary embolism and ACS there is low concern for ACS with a troponin of 7 EKG is unchanged heart score is 2.  She has pleurisy and some atelectasis she was sent home with oral pain management for the just peritoneal discomfort status post liver biopsy without evidence of infection or bleed.  Hemoglobin is unchanged elevated white blood cell count is not present she can follow-up as an outpatient.      Escalation of care considered, and ultimately not performed:acute inpatient care management, however at this time, the  patient is most appropriate for outpatient management    Decision tools and prescription drugs considered including, but not limited to: HEART Score 2 .    In prescribing controlled substances to this patient, I certify that I have obtained and reviewed the medical history of Malia Mckeon. I have also made a good noemy effort to obtain applicable records from other providers who have treated the patient and records did not demonstrate any increased risk of substance abuse that would prevent me from prescribing controlled substances.     I have conducted a physical exam and documented it. I have reviewed Ms. Mckeon’s prescription history as maintained by the [place] Prescription Monitoring Program.     I have assessed the patient’s risk for abuse, dependency, and addiction using the validated Opioid Risk Tool available at https://www.mdcalc.com/qupabg-eslu-ujfv-ort-narcotic-abuse. 4    Given the above, I believe the benefits of controlled substance therapy outweigh the risks. The reasons for prescribing controlled substances include non-narcotic, oral analgesic alternatives have been inadequate for pain control. Accordingly, I have discussed the risk and benefits, treatment plan, and alternative therapies with the patient.     The patient will return for new or worsening symptoms and is stable at the time of discharge.    The patient is referred to a primary physician for blood pressure management, diabetic screening, and for all other preventative health concerns.    DISPOSITION:  Patient will be discharged home in stable condition.    FOLLOW UP:  Noreen Colin M.D.  [address] [place] [address] Dr Teena MCMULLEN [address]  [phone]    Schedule an appointment as soon as possible for a visit       [facility], Emergency Dept  [address] [facility] [address] [phone]    If symptoms worsen      OUTPATIENT MEDICATIONS:  Discharge Medication List as of 3/2/2023 12:23 AM        START taking  these medications    Details   oxyCODONE immediate-release (ROXICODONE) 5 MG Tab Take 1 Tablet by mouth every 6 hours as needed for Severe Pain for up to 3 days., Disp-9 Tablet, R-0, Normal             FINAL DIAGNOSIS  1. Post-operative pain    2. Pleuritic chest pain           Electronically signed by: Lainey Soto M.D., 3/1/2023 8:56 PM

## 2023-03-02 NOTE — ED TRIAGE NOTES
"Chief Complaint   Patient presents with    Post-Op Complications     Pt reports having a liver biopsy yesterday with severe pain, pain is continuing today.       Pt to triage via w/c for above complaint.     Pt presents in triage reporting pain from mid upper abd to chest. Pt reports having a liver biopsy yesterday and having severe pain afterwards. Pt was seen here yesterday for symptoms, CT scan showed small post op bleeding.  Pt was discharged and pain became worse at home, pt called Dr. Cat Ghotra and she looked at CT scan and told pt to return to ER.    CP protocol ordered    Pt back to lobby, educated on triage process and encourage to alert staff of any changes.     BP (!) 165/96   Pulse 100   Temp 36.4 °C (97.6 °F) (Temporal)   Resp 18   Ht 1.651 m (5' 5\")   Wt (!) 141 kg (311 lb 4.6 oz)   SpO2 93% Comment: [place] ir  BMI 51.80 kg/m²      "

## 2023-03-03 LAB — HBV CORE AB SERPL QL IA: REACTIVE

## 2023-04-11 ENCOUNTER — HOSPITAL ENCOUNTER (OUTPATIENT)
Dept: LAB | Facility: MEDICAL CENTER | Age: 58
End: 2023-04-11
Attending: INTERNAL MEDICINE
Payer: COMMERCIAL

## 2023-04-11 LAB
ALBUMIN SERPL BCP-MCNC: 4.2 G/DL (ref 3.2–4.9)
ALP SERPL-CCNC: 148 U/L (ref 30–99)
ALT SERPL-CCNC: 34 U/L (ref 2–50)
AST SERPL-CCNC: 29 U/L (ref 12–45)
BASOPHILS # BLD AUTO: 0.8 % (ref 0–1.8)
BASOPHILS # BLD: 0.05 K/UL (ref 0–0.12)
BILIRUB CONJ SERPL-MCNC: <0.2 MG/DL (ref 0.1–0.5)
BILIRUB INDIRECT SERPL-MCNC: ABNORMAL MG/DL (ref 0–1)
BILIRUB SERPL-MCNC: 0.4 MG/DL (ref 0.1–1.5)
EOSINOPHIL # BLD AUTO: 0.2 K/UL (ref 0–0.51)
EOSINOPHIL NFR BLD: 3.3 % (ref 0–6.9)
ERYTHROCYTE [DISTWIDTH] IN BLOOD BY AUTOMATED COUNT: 47 FL (ref 35.9–50)
GGT SERPL-CCNC: 76 U/L (ref 7–34)
HCT VFR BLD AUTO: 45.8 % (ref 37–47)
HGB BLD-MCNC: 14.9 G/DL (ref 12–16)
IMM GRANULOCYTES # BLD AUTO: 0.02 K/UL (ref 0–0.11)
IMM GRANULOCYTES NFR BLD AUTO: 0.3 % (ref 0–0.9)
LYMPHOCYTES # BLD AUTO: 1.49 K/UL (ref 1–4.8)
LYMPHOCYTES NFR BLD: 24.3 % (ref 22–41)
MCH RBC QN AUTO: 27.6 PG (ref 27–33)
MCHC RBC AUTO-ENTMCNC: 32.5 G/DL (ref 33.6–35)
MCV RBC AUTO: 84.8 FL (ref 81.4–97.8)
MONOCYTES # BLD AUTO: 0.35 K/UL (ref 0–0.85)
MONOCYTES NFR BLD AUTO: 5.7 % (ref 0–13.4)
NEUTROPHILS # BLD AUTO: 4.01 K/UL (ref 2–7.15)
NEUTROPHILS NFR BLD: 65.6 % (ref 44–72)
NRBC # BLD AUTO: 0 K/UL
NRBC BLD-RTO: 0 /100 WBC
PLATELET # BLD AUTO: 244 K/UL (ref 164–446)
PMV BLD AUTO: 9.8 FL (ref 9–12.9)
PROT SERPL-MCNC: 7.5 G/DL (ref 6–8.2)
RBC # BLD AUTO: 5.4 M/UL (ref 4.2–5.4)
WBC # BLD AUTO: 6.1 K/UL (ref 4.8–10.8)

## 2023-04-11 PROCEDURE — 80076 HEPATIC FUNCTION PANEL: CPT

## 2023-04-11 PROCEDURE — 82977 ASSAY OF GGT: CPT

## 2023-04-11 PROCEDURE — 82784 ASSAY IGA/IGD/IGG/IGM EACH: CPT

## 2023-04-11 PROCEDURE — 36415 COLL VENOUS BLD VENIPUNCTURE: CPT

## 2023-04-11 PROCEDURE — 85025 COMPLETE CBC W/AUTO DIFF WBC: CPT

## 2023-04-13 LAB — IGG SERPL-MCNC: 1197 MG/DL (ref 768–1632)

## 2023-05-10 ENCOUNTER — APPOINTMENT (OUTPATIENT)
Dept: CARDIOLOGY | Facility: PHYSICIAN GROUP | Age: 58
End: 2023-05-10
Payer: COMMERCIAL

## 2023-08-03 ENCOUNTER — HOSPITAL ENCOUNTER (OUTPATIENT)
Dept: LAB | Facility: MEDICAL CENTER | Age: 58
End: 2023-08-03
Attending: INTERNAL MEDICINE
Payer: COMMERCIAL

## 2023-08-03 LAB
ALBUMIN SERPL BCP-MCNC: 3.6 G/DL (ref 3.2–4.9)
ALBUMIN/GLOB SERPL: 1.1 G/DL
ALP SERPL-CCNC: 118 U/L (ref 30–99)
ALT SERPL-CCNC: 29 U/L (ref 2–50)
ANION GAP SERPL CALC-SCNC: 10 MMOL/L (ref 7–16)
AST SERPL-CCNC: 21 U/L (ref 12–45)
BASOPHILS # BLD AUTO: 0.8 % (ref 0–1.8)
BASOPHILS # BLD: 0.05 K/UL (ref 0–0.12)
BILIRUB SERPL-MCNC: 0.3 MG/DL (ref 0.1–1.5)
BUN SERPL-MCNC: 12 MG/DL (ref 8–22)
CALCIUM ALBUM COR SERPL-MCNC: 9.3 MG/DL (ref 8.5–10.5)
CALCIUM SERPL-MCNC: 9 MG/DL (ref 8.4–10.2)
CHLORIDE SERPL-SCNC: 106 MMOL/L (ref 96–112)
CO2 SERPL-SCNC: 25 MMOL/L (ref 20–33)
CREAT SERPL-MCNC: 0.63 MG/DL (ref 0.5–1.4)
EOSINOPHIL # BLD AUTO: 0.27 K/UL (ref 0–0.51)
EOSINOPHIL NFR BLD: 4.1 % (ref 0–6.9)
ERYTHROCYTE [DISTWIDTH] IN BLOOD BY AUTOMATED COUNT: 47.7 FL (ref 35.9–50)
GFR SERPLBLD CREATININE-BSD FMLA CKD-EPI: 102 ML/MIN/1.73 M 2
GLOBULIN SER CALC-MCNC: 3.2 G/DL (ref 1.9–3.5)
GLUCOSE SERPL-MCNC: 132 MG/DL (ref 65–99)
HBV SURFACE AG SER QL: NORMAL
HCT VFR BLD AUTO: 42.5 % (ref 37–47)
HGB BLD-MCNC: 13.7 G/DL (ref 12–16)
IMM GRANULOCYTES # BLD AUTO: 0.06 K/UL (ref 0–0.11)
IMM GRANULOCYTES NFR BLD AUTO: 0.9 % (ref 0–0.9)
INR PPP: 0.98 (ref 0.87–1.13)
LYMPHOCYTES # BLD AUTO: 1.58 K/UL (ref 1–4.8)
LYMPHOCYTES NFR BLD: 23.8 % (ref 22–41)
MCH RBC QN AUTO: 27.5 PG (ref 27–33)
MCHC RBC AUTO-ENTMCNC: 32.2 G/DL (ref 32.2–35.5)
MCV RBC AUTO: 85.2 FL (ref 81.4–97.8)
MONOCYTES # BLD AUTO: 0.58 K/UL (ref 0–0.85)
MONOCYTES NFR BLD AUTO: 8.7 % (ref 0–13.4)
NEUTROPHILS # BLD AUTO: 4.09 K/UL (ref 1.82–7.42)
NEUTROPHILS NFR BLD: 61.7 % (ref 44–72)
NRBC # BLD AUTO: 0 K/UL
NRBC BLD-RTO: 0 /100 WBC (ref 0–0.2)
PLATELET # BLD AUTO: 213 K/UL (ref 164–446)
PMV BLD AUTO: 10.1 FL (ref 9–12.9)
POTASSIUM SERPL-SCNC: 3.8 MMOL/L (ref 3.6–5.5)
PROT SERPL-MCNC: 6.8 G/DL (ref 6–8.2)
PROTHROMBIN TIME: 13.4 SEC (ref 12–14.6)
RBC # BLD AUTO: 4.99 M/UL (ref 4.2–5.4)
SODIUM SERPL-SCNC: 141 MMOL/L (ref 135–145)
WBC # BLD AUTO: 6.6 K/UL (ref 4.8–10.8)

## 2023-08-03 PROCEDURE — 85025 COMPLETE CBC W/AUTO DIFF WBC: CPT

## 2023-08-03 PROCEDURE — 36415 COLL VENOUS BLD VENIPUNCTURE: CPT

## 2023-08-03 PROCEDURE — 85610 PROTHROMBIN TIME: CPT

## 2023-08-03 PROCEDURE — 80053 COMPREHEN METABOLIC PANEL: CPT

## 2023-08-03 PROCEDURE — 87340 HEPATITIS B SURFACE AG IA: CPT

## 2023-08-03 PROCEDURE — 82105 ALPHA-FETOPROTEIN SERUM: CPT

## 2023-08-05 LAB — AFP-TM SERPL-MCNC: 6 NG/ML (ref 0–9)

## 2023-08-23 PROBLEM — R11.0 NAUSEA: Status: ACTIVE | Noted: 2023-08-23

## 2023-08-23 PROBLEM — K76.0 FATTY LIVER: Status: ACTIVE | Noted: 2021-09-20

## 2023-08-23 PROBLEM — R07.9 CHEST PAIN AT REST: Status: ACTIVE | Noted: 2023-08-07

## 2023-08-23 PROBLEM — R13.10 DYSPHAGIA: Status: ACTIVE | Noted: 2023-08-10

## 2023-08-23 PROBLEM — Z76.89 ENCOUNTER TO ESTABLISH CARE: Status: ACTIVE | Noted: 2023-08-23

## 2023-08-23 PROBLEM — B18.2 HEP C W/O COMA, CHRONIC (HCC): Status: ACTIVE | Noted: 2023-08-23

## 2023-08-23 PROBLEM — R60.1 GENERALIZED EDEMA: Status: ACTIVE | Noted: 2023-08-07

## 2023-08-23 PROBLEM — K74.60 CIRRHOSIS OF LIVER WITHOUT ASCITES (HCC): Status: ACTIVE | Noted: 2023-08-07

## 2023-08-23 PROBLEM — I42.9 CARDIOMYOPATHY (HCC): Status: ACTIVE | Noted: 2023-08-08

## 2023-08-23 PROBLEM — R73.9 HYPERGLYCEMIA: Status: ACTIVE | Noted: 2023-08-23

## 2023-08-23 PROBLEM — R53.83 FATIGUE: Status: ACTIVE | Noted: 2023-08-23

## 2023-08-23 PROBLEM — R76.8 POSITIVE ANA (ANTINUCLEAR ANTIBODY): Status: ACTIVE | Noted: 2023-08-23

## 2023-08-28 ENCOUNTER — HOSPITAL ENCOUNTER (OUTPATIENT)
Dept: LAB | Facility: MEDICAL CENTER | Age: 58
End: 2023-08-28
Payer: COMMERCIAL

## 2023-08-28 ENCOUNTER — HOSPITAL ENCOUNTER (OUTPATIENT)
Dept: LAB | Facility: MEDICAL CENTER | Age: 58
End: 2023-08-28
Attending: INTERNAL MEDICINE
Payer: COMMERCIAL

## 2023-08-28 DIAGNOSIS — R53.83 FATIGUE, UNSPECIFIED TYPE: ICD-10-CM

## 2023-08-28 LAB
ALBUMIN SERPL BCP-MCNC: 4.3 G/DL (ref 3.2–4.9)
ALBUMIN/GLOB SERPL: 1.3 G/DL
ALP SERPL-CCNC: 94 U/L (ref 30–99)
ALT SERPL-CCNC: 30 U/L (ref 2–50)
ANION GAP SERPL CALC-SCNC: 10 MMOL/L (ref 7–16)
AST SERPL-CCNC: 27 U/L (ref 12–45)
BASOPHILS # BLD AUTO: 0.6 % (ref 0–1.8)
BASOPHILS # BLD: 0.03 K/UL (ref 0–0.12)
BILIRUB SERPL-MCNC: 0.3 MG/DL (ref 0.1–1.5)
BUN SERPL-MCNC: 16 MG/DL (ref 8–22)
CALCIUM ALBUM COR SERPL-MCNC: 8.9 MG/DL (ref 8.5–10.5)
CALCIUM SERPL-MCNC: 9.1 MG/DL (ref 8.4–10.2)
CHLORIDE SERPL-SCNC: 105 MMOL/L (ref 96–112)
CHOLEST SERPL-MCNC: 125 MG/DL (ref 100–199)
CO2 SERPL-SCNC: 24 MMOL/L (ref 20–33)
CREAT SERPL-MCNC: 0.67 MG/DL (ref 0.5–1.4)
EOSINOPHIL # BLD AUTO: 0.24 K/UL (ref 0–0.51)
EOSINOPHIL NFR BLD: 4.5 % (ref 0–6.9)
ERYTHROCYTE [DISTWIDTH] IN BLOOD BY AUTOMATED COUNT: 48 FL (ref 35.9–50)
FASTING STATUS PATIENT QL REPORTED: NORMAL
GFR SERPLBLD CREATININE-BSD FMLA CKD-EPI: 100 ML/MIN/1.73 M 2
GLOBULIN SER CALC-MCNC: 3.3 G/DL (ref 1.9–3.5)
GLUCOSE SERPL-MCNC: 121 MG/DL (ref 65–99)
HCT VFR BLD AUTO: 40.4 % (ref 37–47)
HDLC SERPL-MCNC: 41 MG/DL
HGB BLD-MCNC: 13 G/DL (ref 12–16)
IMM GRANULOCYTES # BLD AUTO: 0.01 K/UL (ref 0–0.11)
IMM GRANULOCYTES NFR BLD AUTO: 0.2 % (ref 0–0.9)
INR PPP: 1.01 (ref 0.87–1.13)
LDLC SERPL CALC-MCNC: 73 MG/DL
LYMPHOCYTES # BLD AUTO: 1.38 K/UL (ref 1–4.8)
LYMPHOCYTES NFR BLD: 25.7 % (ref 22–41)
MCH RBC QN AUTO: 27.7 PG (ref 27–33)
MCHC RBC AUTO-ENTMCNC: 32.2 G/DL (ref 32.2–35.5)
MCV RBC AUTO: 86 FL (ref 81.4–97.8)
MONOCYTES # BLD AUTO: 0.36 K/UL (ref 0–0.85)
MONOCYTES NFR BLD AUTO: 6.7 % (ref 0–13.4)
NEUTROPHILS # BLD AUTO: 3.35 K/UL (ref 1.82–7.42)
NEUTROPHILS NFR BLD: 62.3 % (ref 44–72)
NRBC # BLD AUTO: 0 K/UL
NRBC BLD-RTO: 0 /100 WBC (ref 0–0.2)
PLATELET # BLD AUTO: 258 K/UL (ref 164–446)
PMV BLD AUTO: 10.5 FL (ref 9–12.9)
POTASSIUM SERPL-SCNC: 3.5 MMOL/L (ref 3.6–5.5)
PROT SERPL-MCNC: 7.6 G/DL (ref 6–8.2)
PROTHROMBIN TIME: 13.8 SEC (ref 12–14.6)
RBC # BLD AUTO: 4.7 M/UL (ref 4.2–5.4)
SODIUM SERPL-SCNC: 139 MMOL/L (ref 135–145)
T3FREE SERPL-MCNC: 3.48 PG/ML (ref 2–4.4)
T4 FREE SERPL-MCNC: 1.35 NG/DL (ref 0.93–1.7)
THYROPEROXIDASE AB SERPL-ACNC: 41 IU/ML (ref 0–9)
TRIGL SERPL-MCNC: 54 MG/DL (ref 0–149)
TSH SERPL DL<=0.005 MIU/L-ACNC: 1.89 UIU/ML (ref 0.38–5.33)
WBC # BLD AUTO: 5.4 K/UL (ref 4.8–10.8)

## 2023-08-28 PROCEDURE — 84481 FREE ASSAY (FT-3): CPT

## 2023-08-28 PROCEDURE — 84439 ASSAY OF FREE THYROXINE: CPT

## 2023-08-28 PROCEDURE — 85025 COMPLETE CBC W/AUTO DIFF WBC: CPT

## 2023-08-28 PROCEDURE — 84443 ASSAY THYROID STIM HORMONE: CPT

## 2023-08-28 PROCEDURE — 82105 ALPHA-FETOPROTEIN SERUM: CPT

## 2023-08-28 PROCEDURE — 86376 MICROSOMAL ANTIBODY EACH: CPT

## 2023-08-28 PROCEDURE — 80061 LIPID PANEL: CPT

## 2023-08-28 PROCEDURE — 80053 COMPREHEN METABOLIC PANEL: CPT

## 2023-08-28 PROCEDURE — 85610 PROTHROMBIN TIME: CPT

## 2023-08-28 PROCEDURE — 36415 COLL VENOUS BLD VENIPUNCTURE: CPT

## 2023-08-29 LAB — AFP-TM SERPL-MCNC: 5 NG/ML (ref 0–9)

## 2023-09-09 ENCOUNTER — HOSPITAL ENCOUNTER (OUTPATIENT)
Dept: RADIOLOGY | Facility: MEDICAL CENTER | Age: 58
End: 2023-09-09
Attending: INTERNAL MEDICINE
Payer: COMMERCIAL

## 2023-09-09 DIAGNOSIS — K74.60 HEPATIC CIRRHOSIS, UNSPECIFIED HEPATIC CIRRHOSIS TYPE, UNSPECIFIED WHETHER ASCITES PRESENT (HCC): ICD-10-CM

## 2023-09-09 PROCEDURE — 76705 ECHO EXAM OF ABDOMEN: CPT

## 2023-12-28 PROBLEM — M79.89 LEG SWELLING: Status: ACTIVE | Noted: 2023-12-28

## 2023-12-28 PROBLEM — S06.0X9A CONCUSSION WITH LOSS OF CONSCIOUSNESS: Status: ACTIVE | Noted: 2023-12-28

## 2024-03-26 PROBLEM — K74.69 OTHER CIRRHOSIS OF LIVER (HCC): Status: RESOLVED | Noted: 2021-09-20 | Resolved: 2024-03-26

## 2024-03-26 PROBLEM — R53.83 FATIGUE: Status: RESOLVED | Noted: 2023-08-23 | Resolved: 2024-03-26

## 2024-03-26 PROBLEM — Z76.89 ENCOUNTER TO ESTABLISH CARE: Status: RESOLVED | Noted: 2023-08-23 | Resolved: 2024-03-26

## 2024-03-26 PROBLEM — E66.9 OBESITY: Status: RESOLVED | Noted: 2018-01-26 | Resolved: 2024-03-26

## 2024-03-26 PROBLEM — M79.89 LEG SWELLING: Status: RESOLVED | Noted: 2023-12-28 | Resolved: 2024-03-26

## 2024-03-26 PROBLEM — S06.0X9A CONCUSSION WITH LOSS OF CONSCIOUSNESS: Status: RESOLVED | Noted: 2023-12-28 | Resolved: 2024-03-26

## 2024-03-26 PROBLEM — R11.0 NAUSEA: Status: RESOLVED | Noted: 2023-08-23 | Resolved: 2024-03-26

## 2024-03-26 PROBLEM — Z86.19 HISTORY OF POSITIVE HEPATITIS C: Status: RESOLVED | Noted: 2020-05-28 | Resolved: 2024-03-26

## 2024-03-27 PROBLEM — R07.9 CHEST PAIN: Status: RESOLVED | Noted: 2023-08-07 | Resolved: 2024-03-27

## 2024-03-27 PROBLEM — I42.9 CARDIOMYOPATHY (HCC): Status: RESOLVED | Noted: 2023-08-08 | Resolved: 2024-03-27

## 2024-03-27 PROBLEM — B19.10 HEPATITIS B VIRUS INFECTION: Status: RESOLVED | Noted: 2021-09-20 | Resolved: 2024-03-27

## 2024-03-27 PROBLEM — I51.7 LVH (LEFT VENTRICULAR HYPERTROPHY): Status: ACTIVE | Noted: 2024-03-27

## 2024-03-27 PROBLEM — Z86.19 HISTORY OF HEPATITIS C: Status: ACTIVE | Noted: 2024-03-27

## 2024-03-27 PROBLEM — B18.2 HEP C W/O COMA, CHRONIC (HCC): Status: RESOLVED | Noted: 2023-08-23 | Resolved: 2024-03-27

## 2024-03-29 PROBLEM — I50.32 CHRONIC HEART FAILURE WITH PRESERVED EJECTION FRACTION (HFPEF) (HCC): Status: ACTIVE | Noted: 2024-03-29

## 2024-03-29 PROBLEM — R94.31 ABNORMAL EKG: Status: ACTIVE | Noted: 2024-03-29

## 2024-04-12 ENCOUNTER — HOSPITAL ENCOUNTER (OUTPATIENT)
Dept: LAB | Facility: MEDICAL CENTER | Age: 59
End: 2024-04-12
Attending: FAMILY MEDICINE
Payer: COMMERCIAL

## 2024-04-12 DIAGNOSIS — R73.01 IMPAIRED FASTING GLUCOSE: ICD-10-CM

## 2024-04-12 DIAGNOSIS — R60.1 GENERALIZED EDEMA: ICD-10-CM

## 2024-04-12 DIAGNOSIS — D64.9 ANEMIA, UNSPECIFIED TYPE: ICD-10-CM

## 2024-04-12 DIAGNOSIS — I1A.0 RESISTANT HYPERTENSION: Chronic | ICD-10-CM

## 2024-04-12 LAB
ALBUMIN SERPL BCP-MCNC: 4.1 G/DL (ref 3.2–4.9)
ALBUMIN/GLOB SERPL: 1.3 G/DL
ALP SERPL-CCNC: 124 U/L (ref 30–99)
ALT SERPL-CCNC: 36 U/L (ref 2–50)
ANION GAP SERPL CALC-SCNC: 8 MMOL/L (ref 7–16)
AST SERPL-CCNC: 33 U/L (ref 12–45)
BASOPHILS # BLD AUTO: 0.7 % (ref 0–1.8)
BASOPHILS # BLD: 0.04 K/UL (ref 0–0.12)
BILIRUB SERPL-MCNC: 0.2 MG/DL (ref 0.1–1.5)
BUN SERPL-MCNC: 13 MG/DL (ref 8–22)
CALCIUM ALBUM COR SERPL-MCNC: 8.6 MG/DL (ref 8.5–10.5)
CALCIUM SERPL-MCNC: 8.7 MG/DL (ref 8.4–10.2)
CHLORIDE SERPL-SCNC: 107 MMOL/L (ref 96–112)
CO2 SERPL-SCNC: 29 MMOL/L (ref 20–33)
CREAT SERPL-MCNC: 0.63 MG/DL (ref 0.5–1.4)
EOSINOPHIL # BLD AUTO: 0.28 K/UL (ref 0–0.51)
EOSINOPHIL NFR BLD: 4.6 % (ref 0–6.9)
ERYTHROCYTE [DISTWIDTH] IN BLOOD BY AUTOMATED COUNT: 51.9 FL (ref 35.9–50)
EST. AVERAGE GLUCOSE BLD GHB EST-MCNC: 123 MG/DL
FASTING STATUS PATIENT QL REPORTED: NORMAL
GFR SERPLBLD CREATININE-BSD FMLA CKD-EPI: 101 ML/MIN/1.73 M 2
GLOBULIN SER CALC-MCNC: 3.1 G/DL (ref 1.9–3.5)
GLUCOSE SERPL-MCNC: 107 MG/DL (ref 65–99)
HBA1C MFR BLD: 5.9 % (ref 4–5.6)
HCT VFR BLD AUTO: 44.3 % (ref 37–47)
HGB BLD-MCNC: 13.9 G/DL (ref 12–16)
IMM GRANULOCYTES # BLD AUTO: 0.01 K/UL (ref 0–0.11)
IMM GRANULOCYTES NFR BLD AUTO: 0.2 % (ref 0–0.9)
LYMPHOCYTES # BLD AUTO: 1.6 K/UL (ref 1–4.8)
LYMPHOCYTES NFR BLD: 26.5 % (ref 22–41)
MCH RBC QN AUTO: 26.5 PG (ref 27–33)
MCHC RBC AUTO-ENTMCNC: 31.4 G/DL (ref 32.2–35.5)
MCV RBC AUTO: 84.4 FL (ref 81.4–97.8)
MONOCYTES # BLD AUTO: 0.58 K/UL (ref 0–0.85)
MONOCYTES NFR BLD AUTO: 9.6 % (ref 0–13.4)
NEUTROPHILS # BLD AUTO: 3.52 K/UL (ref 1.82–7.42)
NEUTROPHILS NFR BLD: 58.4 % (ref 44–72)
NRBC # BLD AUTO: 0 K/UL
NRBC BLD-RTO: 0 /100 WBC (ref 0–0.2)
PLATELET # BLD AUTO: 218 K/UL (ref 164–446)
PMV BLD AUTO: 10.4 FL (ref 9–12.9)
POTASSIUM SERPL-SCNC: 4.1 MMOL/L (ref 3.6–5.5)
PROT SERPL-MCNC: 7.2 G/DL (ref 6–8.2)
RBC # BLD AUTO: 5.25 M/UL (ref 4.2–5.4)
SODIUM SERPL-SCNC: 144 MMOL/L (ref 135–145)
TSH SERPL DL<=0.005 MIU/L-ACNC: 1.07 UIU/ML (ref 0.38–5.33)
WBC # BLD AUTO: 6 K/UL (ref 4.8–10.8)

## 2024-04-12 PROCEDURE — 85025 COMPLETE CBC W/AUTO DIFF WBC: CPT

## 2024-04-12 PROCEDURE — 82088 ASSAY OF ALDOSTERONE: CPT

## 2024-04-12 PROCEDURE — 83036 HEMOGLOBIN GLYCOSYLATED A1C: CPT

## 2024-04-12 PROCEDURE — 36415 COLL VENOUS BLD VENIPUNCTURE: CPT

## 2024-04-12 PROCEDURE — 80053 COMPREHEN METABOLIC PANEL: CPT

## 2024-04-12 PROCEDURE — 84244 ASSAY OF RENIN: CPT

## 2024-04-12 PROCEDURE — 84443 ASSAY THYROID STIM HORMONE: CPT

## 2024-04-16 PROBLEM — R60.1 GENERALIZED EDEMA: Status: RESOLVED | Noted: 2023-08-07 | Resolved: 2024-04-16

## 2024-04-16 LAB — RENIN PLAS-CCNC: 1 NG/ML/HR

## 2024-04-19 LAB — ALDOST SERPL-MCNC: 13.1 NG/DL

## 2024-06-19 DIAGNOSIS — R94.31 ABNORMAL EKG: ICD-10-CM

## 2024-06-22 ENCOUNTER — APPOINTMENT (OUTPATIENT)
Dept: RADIOLOGY | Facility: MEDICAL CENTER | Age: 59
End: 2024-06-22
Attending: INTERNAL MEDICINE
Payer: COMMERCIAL

## 2024-07-20 ENCOUNTER — APPOINTMENT (OUTPATIENT)
Dept: RADIOLOGY | Facility: MEDICAL CENTER | Age: 59
End: 2024-07-20
Attending: INTERNAL MEDICINE
Payer: COMMERCIAL

## 2024-08-15 PROBLEM — I89.0 LYMPHEDEMA DUE TO VENOUS DISEASE: Status: ACTIVE | Noted: 2024-08-15

## 2024-08-15 PROBLEM — I99.9 LYMPHEDEMA DUE TO VENOUS DISEASE: Status: ACTIVE | Noted: 2024-08-15

## 2024-08-17 ENCOUNTER — APPOINTMENT (OUTPATIENT)
Dept: RADIOLOGY | Facility: MEDICAL CENTER | Age: 59
End: 2024-08-17
Attending: INTERNAL MEDICINE
Payer: COMMERCIAL

## 2024-08-17 DIAGNOSIS — K74.60 CIRRHOSIS OF LIVER WITHOUT ASCITES, UNSPECIFIED HEPATIC CIRRHOSIS TYPE (HCC): ICD-10-CM

## 2024-08-17 PROCEDURE — 76700 US EXAM ABDOM COMPLETE: CPT

## 2024-08-20 ENCOUNTER — HOSPITAL ENCOUNTER (OUTPATIENT)
Dept: LAB | Facility: MEDICAL CENTER | Age: 59
End: 2024-08-20
Attending: FAMILY MEDICINE
Payer: COMMERCIAL

## 2024-08-20 DIAGNOSIS — I50.32 CHRONIC HEART FAILURE WITH PRESERVED EJECTION FRACTION (HFPEF) (HCC): ICD-10-CM

## 2024-08-20 DIAGNOSIS — Z11.1 TUBERCULOSIS SCREENING: ICD-10-CM

## 2024-08-20 DIAGNOSIS — I10 HYPERTENSION, UNSPECIFIED TYPE: ICD-10-CM

## 2024-08-20 LAB
ALBUMIN SERPL BCP-MCNC: 4 G/DL (ref 3.2–4.9)
ALBUMIN/GLOB SERPL: 1.2 G/DL
ALP SERPL-CCNC: 127 U/L (ref 30–99)
ALT SERPL-CCNC: 42 U/L (ref 2–50)
ANION GAP SERPL CALC-SCNC: 11 MMOL/L (ref 7–16)
ANION GAP SERPL CALC-SCNC: 11 MMOL/L (ref 7–16)
AST SERPL-CCNC: 32 U/L (ref 12–45)
BASOPHILS # BLD AUTO: 0.5 % (ref 0–1.8)
BASOPHILS # BLD: 0.03 K/UL (ref 0–0.12)
BILIRUB SERPL-MCNC: 0.4 MG/DL (ref 0.1–1.5)
BUN SERPL-MCNC: 13 MG/DL (ref 8–22)
BUN SERPL-MCNC: 14 MG/DL (ref 8–22)
CALCIUM ALBUM COR SERPL-MCNC: 8.8 MG/DL (ref 8.5–10.5)
CALCIUM SERPL-MCNC: 8.8 MG/DL (ref 8.4–10.2)
CALCIUM SERPL-MCNC: 8.9 MG/DL (ref 8.4–10.2)
CHLORIDE SERPL-SCNC: 105 MMOL/L (ref 96–112)
CHLORIDE SERPL-SCNC: 105 MMOL/L (ref 96–112)
CO2 SERPL-SCNC: 24 MMOL/L (ref 20–33)
CO2 SERPL-SCNC: 24 MMOL/L (ref 20–33)
CREAT SERPL-MCNC: 0.61 MG/DL (ref 0.5–1.4)
CREAT SERPL-MCNC: 0.65 MG/DL (ref 0.5–1.4)
EOSINOPHIL # BLD AUTO: 0.2 K/UL (ref 0–0.51)
EOSINOPHIL NFR BLD: 3.5 % (ref 0–6.9)
ERYTHROCYTE [DISTWIDTH] IN BLOOD BY AUTOMATED COUNT: 45.5 FL (ref 35.9–50)
FASTING STATUS PATIENT QL REPORTED: NORMAL
FASTING STATUS PATIENT QL REPORTED: NORMAL
FERRITIN SERPL-MCNC: 35.8 NG/ML (ref 10–291)
GFR SERPLBLD CREATININE-BSD FMLA CKD-EPI: 100 ML/MIN/1.73 M 2
GFR SERPLBLD CREATININE-BSD FMLA CKD-EPI: 102 ML/MIN/1.73 M 2
GLOBULIN SER CALC-MCNC: 3.3 G/DL (ref 1.9–3.5)
GLUCOSE SERPL-MCNC: 97 MG/DL (ref 65–99)
GLUCOSE SERPL-MCNC: 99 MG/DL (ref 65–99)
HCT VFR BLD AUTO: 43.1 % (ref 37–47)
HGB BLD-MCNC: 14.3 G/DL (ref 12–16)
IMM GRANULOCYTES # BLD AUTO: 0.01 K/UL (ref 0–0.11)
IMM GRANULOCYTES NFR BLD AUTO: 0.2 % (ref 0–0.9)
INR PPP: 0.99 (ref 0.87–1.13)
IRON SATN MFR SERPL: 16 % (ref 15–55)
IRON SERPL-MCNC: 64 UG/DL (ref 40–170)
LYMPHOCYTES # BLD AUTO: 1.39 K/UL (ref 1–4.8)
LYMPHOCYTES NFR BLD: 24.2 % (ref 22–41)
MCH RBC QN AUTO: 27.9 PG (ref 27–33)
MCHC RBC AUTO-ENTMCNC: 33.2 G/DL (ref 32.2–35.5)
MCV RBC AUTO: 84.2 FL (ref 81.4–97.8)
MONOCYTES # BLD AUTO: 0.46 K/UL (ref 0–0.85)
MONOCYTES NFR BLD AUTO: 8 % (ref 0–13.4)
NEUTROPHILS # BLD AUTO: 3.66 K/UL (ref 1.82–7.42)
NEUTROPHILS NFR BLD: 63.6 % (ref 44–72)
NRBC # BLD AUTO: 0 K/UL
NRBC BLD-RTO: 0 /100 WBC (ref 0–0.2)
PLATELET # BLD AUTO: 226 K/UL (ref 164–446)
PMV BLD AUTO: 10.1 FL (ref 9–12.9)
POTASSIUM SERPL-SCNC: 3.7 MMOL/L (ref 3.6–5.5)
POTASSIUM SERPL-SCNC: 3.8 MMOL/L (ref 3.6–5.5)
PROT SERPL-MCNC: 7.3 G/DL (ref 6–8.2)
PROTHROMBIN TIME: 13.5 SEC (ref 12–14.6)
RBC # BLD AUTO: 5.12 M/UL (ref 4.2–5.4)
SODIUM SERPL-SCNC: 140 MMOL/L (ref 135–145)
SODIUM SERPL-SCNC: 140 MMOL/L (ref 135–145)
TIBC SERPL-MCNC: 391 UG/DL (ref 250–450)
UIBC SERPL-MCNC: 327 UG/DL (ref 110–370)
WBC # BLD AUTO: 5.8 K/UL (ref 4.8–10.8)

## 2024-08-20 PROCEDURE — 80048 BASIC METABOLIC PNL TOTAL CA: CPT

## 2024-08-20 PROCEDURE — 82105 ALPHA-FETOPROTEIN SERUM: CPT

## 2024-08-20 PROCEDURE — 85025 COMPLETE CBC W/AUTO DIFF WBC: CPT

## 2024-08-20 PROCEDURE — 36415 COLL VENOUS BLD VENIPUNCTURE: CPT

## 2024-08-20 PROCEDURE — 80053 COMPREHEN METABOLIC PANEL: CPT

## 2024-08-20 PROCEDURE — 83540 ASSAY OF IRON: CPT

## 2024-08-20 PROCEDURE — 85610 PROTHROMBIN TIME: CPT

## 2024-08-20 PROCEDURE — 86480 TB TEST CELL IMMUN MEASURE: CPT

## 2024-08-20 PROCEDURE — 83550 IRON BINDING TEST: CPT

## 2024-08-20 PROCEDURE — 82728 ASSAY OF FERRITIN: CPT

## 2024-08-21 LAB
GAMMA INTERFERON BACKGROUND BLD IA-ACNC: 0.02 IU/ML
M TB IFN-G BLD-IMP: NEGATIVE
M TB IFN-G CD4+ BCKGRND COR BLD-ACNC: 0 IU/ML
MITOGEN IGNF BCKGRD COR BLD-ACNC: >10 IU/ML
QFT TB2 - NIL TBQ2: 0 IU/ML

## 2024-08-22 LAB — AFP-TM SERPL-MCNC: 5 NG/ML (ref 0–9)

## 2024-08-23 ENCOUNTER — HOSPITAL ENCOUNTER (OUTPATIENT)
Dept: RADIOLOGY | Facility: MEDICAL CENTER | Age: 59
End: 2024-08-23
Attending: FAMILY MEDICINE
Payer: COMMERCIAL

## 2024-08-23 DIAGNOSIS — R16.0 LIVER MASS, LEFT LOBE: ICD-10-CM

## 2024-08-23 DIAGNOSIS — K76.9 LIVER DISEASE: ICD-10-CM

## 2024-08-23 DIAGNOSIS — K76.0 FATTY (CHANGE OF) LIVER, NOT ELSEWHERE CLASSIFIED: ICD-10-CM

## 2024-08-23 PROCEDURE — 74160 CT ABDOMEN W/CONTRAST: CPT

## 2024-10-16 PROBLEM — K76.0 FATTY LIVER: Status: RESOLVED | Noted: 2021-09-20 | Resolved: 2024-10-16

## 2025-05-06 ENCOUNTER — HOSPITAL ENCOUNTER (OUTPATIENT)
Facility: MEDICAL CENTER | Age: 60
End: 2025-05-06
Attending: INTERNAL MEDICINE
Payer: COMMERCIAL

## 2025-05-06 LAB
ALBUMIN SERPL BCP-MCNC: 4.3 G/DL (ref 3.2–4.9)
ALBUMIN/GLOB SERPL: 1.2 G/DL
ALP SERPL-CCNC: 130 U/L (ref 30–99)
ALT SERPL-CCNC: 41 U/L (ref 2–50)
ANION GAP SERPL CALC-SCNC: 12 MMOL/L (ref 7–16)
AST SERPL-CCNC: 31 U/L (ref 12–45)
BASOPHILS # BLD AUTO: 0.5 % (ref 0–1.8)
BASOPHILS # BLD: 0.03 K/UL (ref 0–0.12)
BILIRUB SERPL-MCNC: 0.4 MG/DL (ref 0.1–1.5)
BUN SERPL-MCNC: 12 MG/DL (ref 8–22)
CALCIUM ALBUM COR SERPL-MCNC: 9.1 MG/DL (ref 8.5–10.5)
CALCIUM SERPL-MCNC: 9.3 MG/DL (ref 8.5–10.5)
CHLORIDE SERPL-SCNC: 103 MMOL/L (ref 96–112)
CO2 SERPL-SCNC: 26 MMOL/L (ref 20–33)
CREAT SERPL-MCNC: 0.66 MG/DL (ref 0.5–1.4)
EOSINOPHIL # BLD AUTO: 0.27 K/UL (ref 0–0.51)
EOSINOPHIL NFR BLD: 4.8 % (ref 0–6.9)
ERYTHROCYTE [DISTWIDTH] IN BLOOD BY AUTOMATED COUNT: 47.1 FL (ref 35.9–50)
FASTING STATUS PATIENT QL REPORTED: NORMAL
GFR SERPLBLD CREATININE-BSD FMLA CKD-EPI: 99 ML/MIN/1.73 M 2
GGT SERPL-CCNC: 73 U/L (ref 7–34)
GLOBULIN SER CALC-MCNC: 3.7 G/DL (ref 1.9–3.5)
GLUCOSE SERPL-MCNC: 102 MG/DL (ref 65–99)
HCT VFR BLD AUTO: 43.8 % (ref 37–47)
HGB BLD-MCNC: 13.8 G/DL (ref 12–16)
IMM GRANULOCYTES # BLD AUTO: 0.02 K/UL (ref 0–0.11)
IMM GRANULOCYTES NFR BLD AUTO: 0.4 % (ref 0–0.9)
INR PPP: 0.96 (ref 0.87–1.13)
LYMPHOCYTES # BLD AUTO: 1.19 K/UL (ref 1–4.8)
LYMPHOCYTES NFR BLD: 21.1 % (ref 22–41)
MCH RBC QN AUTO: 27.7 PG (ref 27–33)
MCHC RBC AUTO-ENTMCNC: 31.5 G/DL (ref 32.2–35.5)
MCV RBC AUTO: 87.8 FL (ref 81.4–97.8)
MONOCYTES # BLD AUTO: 0.35 K/UL (ref 0–0.85)
MONOCYTES NFR BLD AUTO: 6.2 % (ref 0–13.4)
NEUTROPHILS # BLD AUTO: 3.78 K/UL (ref 1.82–7.42)
NEUTROPHILS NFR BLD: 67 % (ref 44–72)
NRBC # BLD AUTO: 0 K/UL
NRBC BLD-RTO: 0 /100 WBC (ref 0–0.2)
PLATELET # BLD AUTO: 243 K/UL (ref 164–446)
PMV BLD AUTO: 10.5 FL (ref 9–12.9)
POTASSIUM SERPL-SCNC: 4.1 MMOL/L (ref 3.6–5.5)
PROT SERPL-MCNC: 8 G/DL (ref 6–8.2)
PROTHROMBIN TIME: 13.1 SEC (ref 12–14.6)
RBC # BLD AUTO: 4.99 M/UL (ref 4.2–5.4)
SODIUM SERPL-SCNC: 141 MMOL/L (ref 135–145)
WBC # BLD AUTO: 5.6 K/UL (ref 4.8–10.8)

## 2025-05-06 PROCEDURE — 82977 ASSAY OF GGT: CPT

## 2025-05-06 PROCEDURE — 36415 COLL VENOUS BLD VENIPUNCTURE: CPT

## 2025-05-06 PROCEDURE — 85025 COMPLETE CBC W/AUTO DIFF WBC: CPT

## 2025-05-06 PROCEDURE — 85610 PROTHROMBIN TIME: CPT

## 2025-05-06 PROCEDURE — 82107 ALPHA-FETOPROTEIN L3: CPT

## 2025-05-06 PROCEDURE — 80053 COMPREHEN METABOLIC PANEL: CPT

## 2025-05-08 LAB
AFP L3 MFR SERPL: <0.5 % (ref 0–9.9)
AFP SERPL-MCNC: 5 NG/ML (ref 0–15)